# Patient Record
Sex: FEMALE | Race: WHITE | NOT HISPANIC OR LATINO | URBAN - METROPOLITAN AREA
[De-identification: names, ages, dates, MRNs, and addresses within clinical notes are randomized per-mention and may not be internally consistent; named-entity substitution may affect disease eponyms.]

---

## 2017-04-25 ENCOUNTER — OUTPATIENT (OUTPATIENT)
Dept: OUTPATIENT SERVICES | Facility: HOSPITAL | Age: 42
LOS: 1 days | Discharge: HOME | End: 2017-04-25

## 2017-04-25 DIAGNOSIS — C75.9 MALIGNANT NEOPLASM OF ENDOCRINE GLAND, UNSPECIFIED: ICD-10-CM

## 2017-04-27 ENCOUNTER — APPOINTMENT (OUTPATIENT)
Dept: HEMATOLOGY ONCOLOGY | Facility: CLINIC | Age: 42
End: 2017-04-27

## 2017-04-27 VITALS
SYSTOLIC BLOOD PRESSURE: 158 MMHG | TEMPERATURE: 98 F | BODY MASS INDEX: 28.17 KG/M2 | HEART RATE: 95 BPM | RESPIRATION RATE: 16 BRPM | HEIGHT: 64 IN | DIASTOLIC BLOOD PRESSURE: 117 MMHG | WEIGHT: 165 LBS | OXYGEN SATURATION: 95 %

## 2017-05-01 ENCOUNTER — RX RENEWAL (OUTPATIENT)
Age: 42
End: 2017-05-01

## 2017-05-03 RX ORDER — FENTANYL CITRATE 400 UG/1
400 LOZENGE TRANSMUCOSAL
Qty: 120 | Refills: 0 | Status: ACTIVE | COMMUNITY
Start: 2017-05-01 | End: 1900-01-01

## 2017-05-08 DIAGNOSIS — M79.2 NEURALGIA AND NEURITIS, UNSPECIFIED: ICD-10-CM

## 2017-05-25 ENCOUNTER — OUTPATIENT (OUTPATIENT)
Dept: OUTPATIENT SERVICES | Facility: HOSPITAL | Age: 42
LOS: 1 days | Discharge: HOME | End: 2017-05-25

## 2017-05-25 DIAGNOSIS — M79.2 NEURALGIA AND NEURITIS, UNSPECIFIED: ICD-10-CM

## 2017-05-26 ENCOUNTER — APPOINTMENT (OUTPATIENT)
Dept: HEMATOLOGY ONCOLOGY | Facility: CLINIC | Age: 42
End: 2017-05-26

## 2017-05-26 VITALS
TEMPERATURE: 98.4 F | SYSTOLIC BLOOD PRESSURE: 152 MMHG | HEART RATE: 117 BPM | OXYGEN SATURATION: 95 % | DIASTOLIC BLOOD PRESSURE: 108 MMHG | RESPIRATION RATE: 16 BRPM

## 2017-06-06 RX ORDER — FENTANYL CITRATE 600 UG/1
600 LOZENGE TRANSMUCOSAL
Qty: 120 | Refills: 0 | Status: ACTIVE | COMMUNITY
Start: 2017-05-26 | End: 1900-01-01

## 2017-06-21 ENCOUNTER — OUTPATIENT (OUTPATIENT)
Dept: OUTPATIENT SERVICES | Facility: HOSPITAL | Age: 42
LOS: 1 days | Discharge: HOME | End: 2017-06-21

## 2017-06-21 DIAGNOSIS — M79.2 NEURALGIA AND NEURITIS, UNSPECIFIED: ICD-10-CM

## 2017-06-26 ENCOUNTER — APPOINTMENT (OUTPATIENT)
Dept: HEMATOLOGY ONCOLOGY | Facility: CLINIC | Age: 42
End: 2017-06-26

## 2017-06-26 VITALS
TEMPERATURE: 98 F | HEIGHT: 64.02 IN | HEART RATE: 131 BPM | RESPIRATION RATE: 16 BRPM | OXYGEN SATURATION: 99 % | DIASTOLIC BLOOD PRESSURE: 91 MMHG | SYSTOLIC BLOOD PRESSURE: 149 MMHG

## 2017-06-27 RX ORDER — FENTANYL CITRATE 800 UG/1
800 LOZENGE TRANSMUCOSAL 4 TIMES DAILY
Qty: 120 | Refills: 0 | Status: DISCONTINUED | COMMUNITY
Start: 2017-06-26 | End: 2017-06-27

## 2017-06-27 RX ORDER — ONDANSETRON 4 MG/1
4 TABLET, ORALLY DISINTEGRATING ORAL
Qty: 60 | Refills: 0 | Status: DISCONTINUED | COMMUNITY
Start: 2017-05-26 | End: 2017-06-27

## 2017-07-12 ENCOUNTER — OTHER (OUTPATIENT)
Age: 42
End: 2017-07-12

## 2017-07-19 ENCOUNTER — APPOINTMENT (OUTPATIENT)
Dept: HEMATOLOGY ONCOLOGY | Facility: CLINIC | Age: 42
End: 2017-07-19

## 2017-07-19 VITALS
HEART RATE: 119 BPM | RESPIRATION RATE: 16 BRPM | OXYGEN SATURATION: 95 % | DIASTOLIC BLOOD PRESSURE: 96 MMHG | SYSTOLIC BLOOD PRESSURE: 145 MMHG | TEMPERATURE: 98.1 F

## 2017-07-19 RX ORDER — NALOXEGOL OXALATE 12.5 MG/1
12.5 TABLET, FILM COATED ORAL
Qty: 10 | Refills: 0 | Status: ACTIVE | COMMUNITY
Start: 2017-07-19 | End: 1900-01-01

## 2017-08-01 ENCOUNTER — RX RENEWAL (OUTPATIENT)
Age: 42
End: 2017-08-01

## 2017-08-04 ENCOUNTER — RX RENEWAL (OUTPATIENT)
Age: 42
End: 2017-08-04

## 2017-08-08 ENCOUNTER — RX RENEWAL (OUTPATIENT)
Age: 42
End: 2017-08-08

## 2017-08-09 ENCOUNTER — OUTPATIENT (OUTPATIENT)
Dept: OUTPATIENT SERVICES | Facility: HOSPITAL | Age: 42
LOS: 1 days | Discharge: HOME | End: 2017-08-09

## 2017-08-09 ENCOUNTER — RX RENEWAL (OUTPATIENT)
Age: 42
End: 2017-08-09

## 2017-08-09 DIAGNOSIS — M79.2 NEURALGIA AND NEURITIS, UNSPECIFIED: ICD-10-CM

## 2017-08-10 ENCOUNTER — APPOINTMENT (OUTPATIENT)
Dept: HEMATOLOGY ONCOLOGY | Facility: CLINIC | Age: 42
End: 2017-08-10
Payer: COMMERCIAL

## 2017-08-10 VITALS
RESPIRATION RATE: 16 BRPM | TEMPERATURE: 98.7 F | HEART RATE: 107 BPM | DIASTOLIC BLOOD PRESSURE: 91 MMHG | OXYGEN SATURATION: 96 % | SYSTOLIC BLOOD PRESSURE: 132 MMHG

## 2017-08-10 PROCEDURE — 99215 OFFICE O/P EST HI 40 MIN: CPT

## 2017-08-10 RX ORDER — TAPENTADOL HYDROCHLORIDE 150 MG/1
150 TABLET, FILM COATED, EXTENDED RELEASE ORAL
Qty: 30 | Refills: 0 | Status: DISCONTINUED | COMMUNITY
Start: 2017-05-26 | End: 2017-08-10

## 2017-09-01 ENCOUNTER — APPOINTMENT (OUTPATIENT)
Dept: HEMATOLOGY ONCOLOGY | Facility: CLINIC | Age: 42
End: 2017-09-01
Payer: COMMERCIAL

## 2017-09-01 VITALS
OXYGEN SATURATION: 98 % | DIASTOLIC BLOOD PRESSURE: 78 MMHG | HEART RATE: 126 BPM | RESPIRATION RATE: 16 BRPM | SYSTOLIC BLOOD PRESSURE: 120 MMHG | TEMPERATURE: 97.2 F

## 2017-09-01 PROCEDURE — 99214 OFFICE O/P EST MOD 30 MIN: CPT

## 2017-09-01 RX ORDER — OXYMORPHONE HYDROCHLORIDE 10 MG/1
10 TABLET ORAL
Qty: 60 | Refills: 0 | Status: DISCONTINUED | COMMUNITY
Start: 2017-05-26 | End: 2017-09-01

## 2017-09-01 RX ORDER — OXYMORPHONE HYDROCHLORIDE 10 MG/1
10 TABLET ORAL EVERY 4 HOURS
Qty: 360 | Refills: 0 | Status: DISCONTINUED | COMMUNITY
Start: 2017-08-10 | End: 2017-09-01

## 2017-09-13 ENCOUNTER — OUTPATIENT (OUTPATIENT)
Dept: OUTPATIENT SERVICES | Facility: HOSPITAL | Age: 42
LOS: 1 days | Discharge: HOME | End: 2017-09-13

## 2017-09-13 DIAGNOSIS — M79.2 NEURALGIA AND NEURITIS, UNSPECIFIED: ICD-10-CM

## 2017-09-15 ENCOUNTER — APPOINTMENT (OUTPATIENT)
Dept: HEMATOLOGY ONCOLOGY | Facility: CLINIC | Age: 42
End: 2017-09-15
Payer: COMMERCIAL

## 2017-09-15 VITALS
HEART RATE: 112 BPM | TEMPERATURE: 98.7 F | OXYGEN SATURATION: 96 % | RESPIRATION RATE: 16 BRPM | SYSTOLIC BLOOD PRESSURE: 134 MMHG | DIASTOLIC BLOOD PRESSURE: 90 MMHG

## 2017-09-15 PROCEDURE — 99215 OFFICE O/P EST HI 40 MIN: CPT

## 2017-09-21 ENCOUNTER — APPOINTMENT (OUTPATIENT)
Dept: HEMATOLOGY ONCOLOGY | Facility: CLINIC | Age: 42
End: 2017-09-21

## 2017-09-25 ENCOUNTER — APPOINTMENT (OUTPATIENT)
Dept: HEMATOLOGY ONCOLOGY | Facility: CLINIC | Age: 42
End: 2017-09-25
Payer: COMMERCIAL

## 2017-09-25 VITALS
SYSTOLIC BLOOD PRESSURE: 132 MMHG | RESPIRATION RATE: 16 BRPM | OXYGEN SATURATION: 91 % | TEMPERATURE: 98.2 F | HEART RATE: 114 BPM | DIASTOLIC BLOOD PRESSURE: 91 MMHG

## 2017-09-25 PROCEDURE — 99215 OFFICE O/P EST HI 40 MIN: CPT

## 2017-09-26 ENCOUNTER — RX RENEWAL (OUTPATIENT)
Age: 42
End: 2017-09-26

## 2017-09-29 ENCOUNTER — RX RENEWAL (OUTPATIENT)
Age: 42
End: 2017-09-29

## 2017-10-03 ENCOUNTER — RX RENEWAL (OUTPATIENT)
Age: 42
End: 2017-10-03

## 2017-10-04 ENCOUNTER — RX RENEWAL (OUTPATIENT)
Age: 42
End: 2017-10-04

## 2017-10-09 ENCOUNTER — APPOINTMENT (OUTPATIENT)
Dept: HEMATOLOGY ONCOLOGY | Facility: CLINIC | Age: 42
End: 2017-10-09
Payer: COMMERCIAL

## 2017-10-09 VITALS
TEMPERATURE: 98.1 F | SYSTOLIC BLOOD PRESSURE: 170 MMHG | RESPIRATION RATE: 16 BRPM | DIASTOLIC BLOOD PRESSURE: 118 MMHG | HEART RATE: 92 BPM | OXYGEN SATURATION: 100 %

## 2017-10-09 PROCEDURE — 99215 OFFICE O/P EST HI 40 MIN: CPT

## 2017-10-16 ENCOUNTER — MEDICATION RENEWAL (OUTPATIENT)
Age: 42
End: 2017-10-16

## 2017-10-18 ENCOUNTER — OUTPATIENT (OUTPATIENT)
Dept: OUTPATIENT SERVICES | Facility: HOSPITAL | Age: 42
LOS: 1 days | Discharge: HOME | End: 2017-10-18

## 2017-10-18 DIAGNOSIS — M79.2 NEURALGIA AND NEURITIS, UNSPECIFIED: ICD-10-CM

## 2017-10-23 ENCOUNTER — RX RENEWAL (OUTPATIENT)
Age: 42
End: 2017-10-23

## 2017-10-27 ENCOUNTER — MEDICATION RENEWAL (OUTPATIENT)
Age: 42
End: 2017-10-27

## 2017-11-03 ENCOUNTER — APPOINTMENT (OUTPATIENT)
Dept: HEMATOLOGY ONCOLOGY | Facility: CLINIC | Age: 42
End: 2017-11-03
Payer: COMMERCIAL

## 2017-11-03 VITALS
BODY MASS INDEX: 27.45 KG/M2 | SYSTOLIC BLOOD PRESSURE: 140 MMHG | DIASTOLIC BLOOD PRESSURE: 104 MMHG | TEMPERATURE: 98.9 F | HEART RATE: 116 BPM | WEIGHT: 160 LBS | RESPIRATION RATE: 16 BRPM | OXYGEN SATURATION: 96 %

## 2017-11-03 PROCEDURE — 99215 OFFICE O/P EST HI 40 MIN: CPT

## 2017-11-09 ENCOUNTER — RX RENEWAL (OUTPATIENT)
Age: 42
End: 2017-11-09

## 2017-11-13 ENCOUNTER — APPOINTMENT (OUTPATIENT)
Dept: HEMATOLOGY ONCOLOGY | Facility: CLINIC | Age: 42
End: 2017-11-13

## 2017-11-15 ENCOUNTER — OUTPATIENT (OUTPATIENT)
Dept: OUTPATIENT SERVICES | Facility: HOSPITAL | Age: 42
LOS: 1 days | Discharge: HOME | End: 2017-11-15

## 2017-11-15 DIAGNOSIS — M79.2 NEURALGIA AND NEURITIS, UNSPECIFIED: ICD-10-CM

## 2017-11-20 ENCOUNTER — APPOINTMENT (OUTPATIENT)
Dept: HEMATOLOGY ONCOLOGY | Facility: CLINIC | Age: 42
End: 2017-11-20
Payer: COMMERCIAL

## 2017-11-20 VITALS
OXYGEN SATURATION: 96 % | DIASTOLIC BLOOD PRESSURE: 82 MMHG | RESPIRATION RATE: 16 BRPM | HEART RATE: 112 BPM | WEIGHT: 159.84 LBS | BODY MASS INDEX: 27.42 KG/M2 | SYSTOLIC BLOOD PRESSURE: 160 MMHG | TEMPERATURE: 99.1 F

## 2017-11-20 PROCEDURE — 99215 OFFICE O/P EST HI 40 MIN: CPT

## 2017-11-20 RX ORDER — FENTANYL CITRATE 600 UG/1
600 LOZENGE TRANSMUCOSAL
Qty: 120 | Refills: 0 | Status: ACTIVE | COMMUNITY
Start: 2017-11-20 | End: 1900-01-01

## 2017-11-21 ENCOUNTER — RX RENEWAL (OUTPATIENT)
Age: 42
End: 2017-11-21

## 2017-12-04 ENCOUNTER — APPOINTMENT (OUTPATIENT)
Dept: HEMATOLOGY ONCOLOGY | Facility: CLINIC | Age: 42
End: 2017-12-04
Payer: COMMERCIAL

## 2017-12-04 VITALS
TEMPERATURE: 98.9 F | OXYGEN SATURATION: 95 % | HEART RATE: 83 BPM | RESPIRATION RATE: 16 BRPM | DIASTOLIC BLOOD PRESSURE: 96 MMHG | SYSTOLIC BLOOD PRESSURE: 181 MMHG

## 2017-12-04 PROCEDURE — 99215 OFFICE O/P EST HI 40 MIN: CPT

## 2017-12-12 ENCOUNTER — OUTPATIENT (OUTPATIENT)
Dept: OUTPATIENT SERVICES | Facility: HOSPITAL | Age: 42
LOS: 1 days | Discharge: HOME | End: 2017-12-12

## 2017-12-12 DIAGNOSIS — M79.2 NEURALGIA AND NEURITIS, UNSPECIFIED: ICD-10-CM

## 2017-12-18 ENCOUNTER — APPOINTMENT (OUTPATIENT)
Dept: HEMATOLOGY ONCOLOGY | Facility: CLINIC | Age: 42
End: 2017-12-18
Payer: COMMERCIAL

## 2017-12-18 VITALS
TEMPERATURE: 98.5 F | OXYGEN SATURATION: 96 % | HEART RATE: 112 BPM | SYSTOLIC BLOOD PRESSURE: 150 MMHG | DIASTOLIC BLOOD PRESSURE: 80 MMHG | RESPIRATION RATE: 16 BRPM

## 2017-12-18 PROCEDURE — 99215 OFFICE O/P EST HI 40 MIN: CPT

## 2017-12-20 ENCOUNTER — RX RENEWAL (OUTPATIENT)
Age: 42
End: 2017-12-20

## 2017-12-22 ENCOUNTER — RX RENEWAL (OUTPATIENT)
Age: 42
End: 2017-12-22

## 2017-12-29 ENCOUNTER — MEDICATION RENEWAL (OUTPATIENT)
Age: 42
End: 2017-12-29

## 2018-01-03 ENCOUNTER — RX RENEWAL (OUTPATIENT)
Age: 43
End: 2018-01-03

## 2018-01-04 ENCOUNTER — APPOINTMENT (OUTPATIENT)
Dept: HEMATOLOGY ONCOLOGY | Facility: CLINIC | Age: 43
End: 2018-01-04

## 2018-01-16 ENCOUNTER — RX RENEWAL (OUTPATIENT)
Age: 43
End: 2018-01-16

## 2018-01-17 ENCOUNTER — OUTPATIENT (OUTPATIENT)
Dept: OUTPATIENT SERVICES | Facility: HOSPITAL | Age: 43
LOS: 1 days | Discharge: HOME | End: 2018-01-17

## 2018-01-17 DIAGNOSIS — M79.2 NEURALGIA AND NEURITIS, UNSPECIFIED: ICD-10-CM

## 2018-01-18 ENCOUNTER — APPOINTMENT (OUTPATIENT)
Dept: HEMATOLOGY ONCOLOGY | Facility: CLINIC | Age: 43
End: 2018-01-18

## 2018-01-23 ENCOUNTER — RX RENEWAL (OUTPATIENT)
Age: 43
End: 2018-01-23

## 2018-01-23 RX ORDER — TAPENTADOL HYDROCHLORIDE 150 MG/1
150 TABLET, FILM COATED, EXTENDED RELEASE ORAL
Qty: 30 | Refills: 0 | Status: ACTIVE | COMMUNITY
Start: 2017-09-15 | End: 1900-01-01

## 2018-01-25 ENCOUNTER — APPOINTMENT (OUTPATIENT)
Dept: HEMATOLOGY ONCOLOGY | Facility: CLINIC | Age: 43
End: 2018-01-25
Payer: COMMERCIAL

## 2018-01-25 VITALS
OXYGEN SATURATION: 97 % | RESPIRATION RATE: 16 BRPM | HEART RATE: 97 BPM | SYSTOLIC BLOOD PRESSURE: 176 MMHG | DIASTOLIC BLOOD PRESSURE: 132 MMHG | TEMPERATURE: 98.3 F

## 2018-01-25 PROCEDURE — 99215 OFFICE O/P EST HI 40 MIN: CPT

## 2018-01-25 RX ORDER — OXYMORPHONE HYDROCHLORIDE 20 MG/1
20 TABLET, FILM COATED, EXTENDED RELEASE ORAL
Qty: 30 | Refills: 0 | Status: ACTIVE | COMMUNITY
Start: 2018-01-25 | End: 1900-01-01

## 2018-02-07 ENCOUNTER — RX RENEWAL (OUTPATIENT)
Age: 43
End: 2018-02-07

## 2018-02-08 ENCOUNTER — RX RENEWAL (OUTPATIENT)
Age: 43
End: 2018-02-08

## 2018-02-15 ENCOUNTER — OUTPATIENT (OUTPATIENT)
Dept: OUTPATIENT SERVICES | Facility: HOSPITAL | Age: 43
LOS: 1 days | Discharge: HOME | End: 2018-02-15

## 2018-02-15 DIAGNOSIS — M79.2 NEURALGIA AND NEURITIS, UNSPECIFIED: ICD-10-CM

## 2018-02-22 ENCOUNTER — APPOINTMENT (OUTPATIENT)
Dept: HEMATOLOGY ONCOLOGY | Facility: CLINIC | Age: 43
End: 2018-02-22
Payer: COMMERCIAL

## 2018-02-22 VITALS — OXYGEN SATURATION: 99 % | RESPIRATION RATE: 16 BRPM | HEART RATE: 90 BPM | TEMPERATURE: 98 F

## 2018-02-22 PROCEDURE — 99215 OFFICE O/P EST HI 40 MIN: CPT

## 2018-02-28 ENCOUNTER — RX RENEWAL (OUTPATIENT)
Age: 43
End: 2018-02-28

## 2018-03-08 ENCOUNTER — APPOINTMENT (OUTPATIENT)
Dept: HEMATOLOGY ONCOLOGY | Facility: CLINIC | Age: 43
End: 2018-03-08
Payer: COMMERCIAL

## 2018-03-08 VITALS
SYSTOLIC BLOOD PRESSURE: 180 MMHG | OXYGEN SATURATION: 96 % | DIASTOLIC BLOOD PRESSURE: 133 MMHG | TEMPERATURE: 98.6 F | HEART RATE: 117 BPM | RESPIRATION RATE: 16 BRPM

## 2018-03-08 PROCEDURE — 99215 OFFICE O/P EST HI 40 MIN: CPT

## 2018-03-09 ENCOUNTER — APPOINTMENT (OUTPATIENT)
Dept: PHYSICAL MEDICINE AND REHAB | Facility: CLINIC | Age: 43
End: 2018-03-09
Payer: COMMERCIAL

## 2018-03-09 VITALS
OXYGEN SATURATION: 95 % | SYSTOLIC BLOOD PRESSURE: 164 MMHG | TEMPERATURE: 98 F | DIASTOLIC BLOOD PRESSURE: 121 MMHG | HEART RATE: 91 BPM

## 2018-03-09 PROCEDURE — 99204 OFFICE O/P NEW MOD 45 MIN: CPT

## 2018-03-19 ENCOUNTER — RX RENEWAL (OUTPATIENT)
Age: 43
End: 2018-03-19

## 2018-03-19 RX ORDER — OXYMORPHONE HYDROCHLORIDE 20 MG/1
20 TABLET, FILM COATED, EXTENDED RELEASE ORAL
Qty: 60 | Refills: 0 | Status: ACTIVE | COMMUNITY
Start: 2018-02-22 | End: 1900-01-01

## 2018-03-28 ENCOUNTER — OUTPATIENT (OUTPATIENT)
Dept: OUTPATIENT SERVICES | Facility: HOSPITAL | Age: 43
LOS: 1 days | Discharge: HOME | End: 2018-03-28

## 2018-03-28 DIAGNOSIS — M79.2 NEURALGIA AND NEURITIS, UNSPECIFIED: ICD-10-CM

## 2018-03-30 ENCOUNTER — APPOINTMENT (OUTPATIENT)
Dept: HEMATOLOGY ONCOLOGY | Facility: CLINIC | Age: 43
End: 2018-03-30
Payer: COMMERCIAL

## 2018-03-30 VITALS
RESPIRATION RATE: 16 BRPM | TEMPERATURE: 98.5 F | OXYGEN SATURATION: 95 % | SYSTOLIC BLOOD PRESSURE: 147 MMHG | HEART RATE: 100 BPM | DIASTOLIC BLOOD PRESSURE: 100 MMHG

## 2018-03-30 PROCEDURE — 99215 OFFICE O/P EST HI 40 MIN: CPT

## 2018-03-30 RX ORDER — CLONIDINE 0.2 MG/24H
0.2 PATCH, EXTENDED RELEASE TRANSDERMAL
Qty: 4 | Refills: 0 | Status: ACTIVE | COMMUNITY
Start: 2018-03-30 | End: 1900-01-01

## 2018-04-13 ENCOUNTER — MEDICATION RENEWAL (OUTPATIENT)
Age: 43
End: 2018-04-13

## 2018-04-13 ENCOUNTER — APPOINTMENT (OUTPATIENT)
Dept: HEMATOLOGY ONCOLOGY | Facility: CLINIC | Age: 43
End: 2018-04-13
Payer: COMMERCIAL

## 2018-04-13 VITALS
TEMPERATURE: 98.5 F | SYSTOLIC BLOOD PRESSURE: 150 MMHG | DIASTOLIC BLOOD PRESSURE: 111 MMHG | WEIGHT: 160 LBS | OXYGEN SATURATION: 96 % | BODY MASS INDEX: 27.45 KG/M2 | RESPIRATION RATE: 16 BRPM | HEART RATE: 115 BPM

## 2018-04-13 PROCEDURE — 99215 OFFICE O/P EST HI 40 MIN: CPT

## 2018-04-13 RX ORDER — SUCRALFATE 1 G/10ML
1 SUSPENSION ORAL 3 TIMES DAILY
Qty: 900 | Refills: 0 | Status: ACTIVE | COMMUNITY
Start: 2017-10-03 | End: 1900-01-01

## 2018-04-13 RX ORDER — KETOROLAC TROMETHAMINE 10 MG/1
10 TABLET, FILM COATED ORAL 3 TIMES DAILY
Qty: 21 | Refills: 0 | Status: ACTIVE | COMMUNITY
Start: 2018-04-13 | End: 1900-01-01

## 2018-04-24 ENCOUNTER — RX RENEWAL (OUTPATIENT)
Age: 43
End: 2018-04-24

## 2018-05-01 ENCOUNTER — OUTPATIENT (OUTPATIENT)
Dept: OUTPATIENT SERVICES | Facility: HOSPITAL | Age: 43
LOS: 1 days | Discharge: HOME | End: 2018-05-01

## 2018-05-01 DIAGNOSIS — M79.2 NEURALGIA AND NEURITIS, UNSPECIFIED: ICD-10-CM

## 2018-05-03 ENCOUNTER — APPOINTMENT (OUTPATIENT)
Dept: HEMATOLOGY ONCOLOGY | Facility: CLINIC | Age: 43
End: 2018-05-03
Payer: COMMERCIAL

## 2018-05-03 VITALS
HEART RATE: 118 BPM | RESPIRATION RATE: 16 BRPM | DIASTOLIC BLOOD PRESSURE: 82 MMHG | SYSTOLIC BLOOD PRESSURE: 150 MMHG | OXYGEN SATURATION: 96 % | TEMPERATURE: 99.2 F

## 2018-05-03 PROCEDURE — 99215 OFFICE O/P EST HI 40 MIN: CPT

## 2018-05-17 ENCOUNTER — APPOINTMENT (OUTPATIENT)
Dept: HEMATOLOGY ONCOLOGY | Facility: CLINIC | Age: 43
End: 2018-05-17
Payer: COMMERCIAL

## 2018-05-17 VITALS
DIASTOLIC BLOOD PRESSURE: 80 MMHG | HEART RATE: 110 BPM | SYSTOLIC BLOOD PRESSURE: 140 MMHG | OXYGEN SATURATION: 96 % | RESPIRATION RATE: 16 BRPM | TEMPERATURE: 98.6 F

## 2018-05-17 PROCEDURE — 99214 OFFICE O/P EST MOD 30 MIN: CPT

## 2018-05-22 ENCOUNTER — RX RENEWAL (OUTPATIENT)
Age: 43
End: 2018-05-22

## 2018-05-31 ENCOUNTER — APPOINTMENT (OUTPATIENT)
Dept: HEMATOLOGY ONCOLOGY | Facility: CLINIC | Age: 43
End: 2018-05-31
Payer: COMMERCIAL

## 2018-05-31 VITALS
OXYGEN SATURATION: 97 % | TEMPERATURE: 99.2 F | SYSTOLIC BLOOD PRESSURE: 146 MMHG | DIASTOLIC BLOOD PRESSURE: 78 MMHG | HEART RATE: 126 BPM | RESPIRATION RATE: 18 BRPM

## 2018-05-31 PROCEDURE — 99215 OFFICE O/P EST HI 40 MIN: CPT

## 2018-05-31 RX ORDER — TAPENTADOL HYDROCHLORIDE 50 MG/1
50 TABLET, FILM COATED, EXTENDED RELEASE ORAL
Qty: 60 | Refills: 0 | Status: ACTIVE | COMMUNITY
Start: 2017-07-19 | End: 1900-01-01

## 2018-05-31 RX ORDER — CARISOPRODOL 250 MG/1
250 TABLET ORAL
Qty: 120 | Refills: 0 | Status: DISCONTINUED | COMMUNITY
Start: 2017-09-01 | End: 2018-05-31

## 2018-05-31 RX ORDER — CARISOPRODOL 250 MG/1
250 TABLET ORAL
Qty: 120 | Refills: 0 | Status: ACTIVE | COMMUNITY
Start: 2018-05-31 | End: 1900-01-01

## 2018-06-13 ENCOUNTER — RX RENEWAL (OUTPATIENT)
Age: 43
End: 2018-06-13

## 2018-06-13 RX ORDER — HYDROMORPHONE HYDROCHLORIDE 8 MG/1
8 TABLET ORAL
Qty: 112 | Refills: 0 | Status: ACTIVE | COMMUNITY
Start: 2018-05-17 | End: 1900-01-01

## 2018-06-15 ENCOUNTER — OUTPATIENT (OUTPATIENT)
Dept: OUTPATIENT SERVICES | Facility: HOSPITAL | Age: 43
LOS: 1 days | Discharge: HOME | End: 2018-06-15

## 2018-06-15 DIAGNOSIS — M79.2 NEURALGIA AND NEURITIS, UNSPECIFIED: ICD-10-CM

## 2018-06-18 ENCOUNTER — APPOINTMENT (OUTPATIENT)
Dept: HEMATOLOGY ONCOLOGY | Facility: CLINIC | Age: 43
End: 2018-06-18

## 2018-06-21 ENCOUNTER — RX RENEWAL (OUTPATIENT)
Age: 43
End: 2018-06-21

## 2018-06-21 RX ORDER — FENTANYL CITRATE 800 UG/1
800 LOZENGE TRANSMUCOSAL
Qty: 180 | Refills: 0 | Status: ACTIVE | COMMUNITY
Start: 2017-10-04 | End: 1900-01-01

## 2018-06-27 ENCOUNTER — APPOINTMENT (OUTPATIENT)
Dept: PHYSICAL MEDICINE AND REHAB | Facility: CLINIC | Age: 43
End: 2018-06-27

## 2018-07-02 ENCOUNTER — APPOINTMENT (OUTPATIENT)
Dept: HEMATOLOGY ONCOLOGY | Facility: CLINIC | Age: 43
End: 2018-07-02
Payer: COMMERCIAL

## 2018-07-02 VITALS
HEART RATE: 103 BPM | DIASTOLIC BLOOD PRESSURE: 110 MMHG | SYSTOLIC BLOOD PRESSURE: 159 MMHG | OXYGEN SATURATION: 95 % | RESPIRATION RATE: 16 BRPM | TEMPERATURE: 98.4 F

## 2018-07-02 DIAGNOSIS — T40.2X5A DRUG INDUCED CONSTIPATION: ICD-10-CM

## 2018-07-02 DIAGNOSIS — K59.03 DRUG INDUCED CONSTIPATION: ICD-10-CM

## 2018-07-02 DIAGNOSIS — R11.0 NAUSEA: ICD-10-CM

## 2018-07-02 PROCEDURE — 99215 OFFICE O/P EST HI 40 MIN: CPT

## 2018-07-04 PROBLEM — K59.03 CONSTIPATION DUE TO OPIOID THERAPY: Status: ACTIVE | Noted: 2017-07-19

## 2018-07-04 PROBLEM — R11.0 NAUSEA: Status: ACTIVE | Noted: 2017-06-26

## 2018-07-13 ENCOUNTER — OUTPATIENT (OUTPATIENT)
Dept: OUTPATIENT SERVICES | Facility: HOSPITAL | Age: 43
LOS: 1 days | Discharge: HOME | End: 2018-07-13

## 2018-07-13 DIAGNOSIS — M79.2 NEURALGIA AND NEURITIS, UNSPECIFIED: ICD-10-CM

## 2018-07-20 ENCOUNTER — APPOINTMENT (OUTPATIENT)
Dept: HEMATOLOGY ONCOLOGY | Facility: CLINIC | Age: 43
End: 2018-07-20

## 2018-08-02 ENCOUNTER — APPOINTMENT (OUTPATIENT)
Dept: HEMATOLOGY ONCOLOGY | Facility: CLINIC | Age: 43
End: 2018-08-02
Payer: COMMERCIAL

## 2018-08-02 VITALS
HEART RATE: 91 BPM | TEMPERATURE: 99 F | SYSTOLIC BLOOD PRESSURE: 186 MMHG | OXYGEN SATURATION: 97 % | DIASTOLIC BLOOD PRESSURE: 132 MMHG | RESPIRATION RATE: 16 BRPM

## 2018-08-02 PROCEDURE — 99215 OFFICE O/P EST HI 40 MIN: CPT

## 2018-08-09 ENCOUNTER — RX RENEWAL (OUTPATIENT)
Age: 43
End: 2018-08-09

## 2018-08-13 ENCOUNTER — OUTPATIENT (OUTPATIENT)
Dept: OUTPATIENT SERVICES | Facility: HOSPITAL | Age: 43
LOS: 1 days | Discharge: HOME | End: 2018-08-13

## 2018-08-13 DIAGNOSIS — M79.2 NEURALGIA AND NEURITIS, UNSPECIFIED: ICD-10-CM

## 2018-08-23 ENCOUNTER — APPOINTMENT (OUTPATIENT)
Dept: HEMATOLOGY ONCOLOGY | Facility: CLINIC | Age: 43
End: 2018-08-23
Payer: COMMERCIAL

## 2018-08-23 VITALS
RESPIRATION RATE: 16 BRPM | HEART RATE: 93 BPM | SYSTOLIC BLOOD PRESSURE: 150 MMHG | DIASTOLIC BLOOD PRESSURE: 90 MMHG | TEMPERATURE: 98.9 F | OXYGEN SATURATION: 97 %

## 2018-08-23 PROCEDURE — 99215 OFFICE O/P EST HI 40 MIN: CPT

## 2018-08-23 RX ORDER — OXYMORPHONE HYDROCHLORIDE 10 MG/1
10 TABLET ORAL
Qty: 360 | Refills: 0 | Status: DISCONTINUED | COMMUNITY
Start: 2017-08-10 | End: 2018-08-23

## 2018-09-01 ENCOUNTER — RX RENEWAL (OUTPATIENT)
Age: 43
End: 2018-09-01

## 2018-09-01 RX ORDER — ONDANSETRON HYDROCHLORIDE 4 MG/1
4 TABLET, ORALLY DISINTEGRATING ORAL
Qty: 60 | Refills: 0 | Status: ACTIVE | COMMUNITY
Start: 2018-09-01 | End: 1900-01-01

## 2018-09-11 ENCOUNTER — APPOINTMENT (OUTPATIENT)
Dept: HEMATOLOGY ONCOLOGY | Facility: CLINIC | Age: 43
End: 2018-09-11

## 2018-09-11 ENCOUNTER — OUTPATIENT (OUTPATIENT)
Dept: OUTPATIENT SERVICES | Facility: HOSPITAL | Age: 43
LOS: 1 days | Discharge: HOME | End: 2018-09-11

## 2018-09-11 DIAGNOSIS — M79.2 NEURALGIA AND NEURITIS, UNSPECIFIED: ICD-10-CM

## 2018-09-13 ENCOUNTER — RX RENEWAL (OUTPATIENT)
Age: 43
End: 2018-09-13

## 2018-09-13 ENCOUNTER — APPOINTMENT (OUTPATIENT)
Dept: HEMATOLOGY ONCOLOGY | Facility: CLINIC | Age: 43
End: 2018-09-13

## 2018-09-27 ENCOUNTER — APPOINTMENT (OUTPATIENT)
Dept: HEMATOLOGY ONCOLOGY | Facility: CLINIC | Age: 43
End: 2018-09-27
Payer: COMMERCIAL

## 2018-09-27 VITALS
DIASTOLIC BLOOD PRESSURE: 118 MMHG | TEMPERATURE: 98.9 F | OXYGEN SATURATION: 97 % | SYSTOLIC BLOOD PRESSURE: 167 MMHG | RESPIRATION RATE: 16 BRPM

## 2018-09-27 PROCEDURE — 99215 OFFICE O/P EST HI 40 MIN: CPT

## 2018-09-28 RX ORDER — METHADONE HYDROCHLORIDE 10 MG/1
10 TABLET ORAL
Qty: 11 | Refills: 0 | Status: ACTIVE | COMMUNITY
Start: 2018-09-28 | End: 1900-01-01

## 2018-10-16 ENCOUNTER — RX RENEWAL (OUTPATIENT)
Age: 43
End: 2018-10-16

## 2018-10-17 ENCOUNTER — RX RENEWAL (OUTPATIENT)
Age: 43
End: 2018-10-17

## 2018-10-18 ENCOUNTER — RX CHANGE (OUTPATIENT)
Age: 43
End: 2018-10-18

## 2018-10-19 ENCOUNTER — OUTPATIENT (OUTPATIENT)
Dept: OUTPATIENT SERVICES | Facility: HOSPITAL | Age: 43
LOS: 1 days | Discharge: HOME | End: 2018-10-19

## 2018-10-19 DIAGNOSIS — M79.2 NEURALGIA AND NEURITIS, UNSPECIFIED: ICD-10-CM

## 2018-10-25 ENCOUNTER — APPOINTMENT (OUTPATIENT)
Dept: HEMATOLOGY ONCOLOGY | Facility: CLINIC | Age: 43
End: 2018-10-25
Payer: COMMERCIAL

## 2018-10-25 VITALS
OXYGEN SATURATION: 94 % | SYSTOLIC BLOOD PRESSURE: 173 MMHG | HEART RATE: 121 BPM | DIASTOLIC BLOOD PRESSURE: 110 MMHG | RESPIRATION RATE: 17 BRPM | TEMPERATURE: 98.9 F

## 2018-10-25 PROCEDURE — 99215 OFFICE O/P EST HI 40 MIN: CPT

## 2018-10-26 ENCOUNTER — RX RENEWAL (OUTPATIENT)
Age: 43
End: 2018-10-26

## 2018-10-26 RX ORDER — OXYMORPHONE HYDROCHLORIDE 10 MG/1
10 TABLET ORAL
Qty: 150 | Refills: 0 | Status: DISCONTINUED | COMMUNITY
Start: 2017-05-26 | End: 2018-10-26

## 2018-11-16 ENCOUNTER — APPOINTMENT (OUTPATIENT)
Dept: HEMATOLOGY ONCOLOGY | Facility: CLINIC | Age: 43
End: 2018-11-16
Payer: COMMERCIAL

## 2018-11-16 VITALS
DIASTOLIC BLOOD PRESSURE: 129 MMHG | RESPIRATION RATE: 16 BRPM | WEIGHT: 160 LBS | SYSTOLIC BLOOD PRESSURE: 176 MMHG | OXYGEN SATURATION: 98 % | HEART RATE: 96 BPM | BODY MASS INDEX: 27.45 KG/M2 | TEMPERATURE: 98.3 F

## 2018-11-16 PROCEDURE — 99215 OFFICE O/P EST HI 40 MIN: CPT

## 2018-11-16 RX ORDER — LORAZEPAM 2 MG/ML
2 SOLUTION, CONCENTRATE ORAL
Qty: 120 | Refills: 0 | Status: ACTIVE | COMMUNITY
Start: 2018-11-16 | End: 1900-01-01

## 2018-11-27 ENCOUNTER — RX RENEWAL (OUTPATIENT)
Age: 43
End: 2018-11-27

## 2018-12-10 ENCOUNTER — APPOINTMENT (OUTPATIENT)
Dept: HEMATOLOGY ONCOLOGY | Facility: CLINIC | Age: 43
End: 2018-12-10

## 2018-12-10 ENCOUNTER — OUTPATIENT (OUTPATIENT)
Dept: OUTPATIENT SERVICES | Facility: HOSPITAL | Age: 43
LOS: 1 days | Discharge: HOME | End: 2018-12-10

## 2018-12-10 DIAGNOSIS — M79.2 NEURALGIA AND NEURITIS, UNSPECIFIED: ICD-10-CM

## 2018-12-13 ENCOUNTER — APPOINTMENT (OUTPATIENT)
Dept: HEMATOLOGY ONCOLOGY | Facility: CLINIC | Age: 43
End: 2018-12-13
Payer: COMMERCIAL

## 2018-12-13 VITALS
DIASTOLIC BLOOD PRESSURE: 152 MMHG | TEMPERATURE: 98.1 F | HEART RATE: 108 BPM | SYSTOLIC BLOOD PRESSURE: 199 MMHG | OXYGEN SATURATION: 98 % | RESPIRATION RATE: 17 BRPM

## 2018-12-13 DIAGNOSIS — R26.9 UNSPECIFIED ABNORMALITIES OF GAIT AND MOBILITY: ICD-10-CM

## 2018-12-13 PROCEDURE — 99215 OFFICE O/P EST HI 40 MIN: CPT

## 2018-12-13 RX ORDER — DEXAMETHASONE 4 MG/1
4 TABLET ORAL
Qty: 60 | Refills: 0 | Status: ACTIVE | COMMUNITY
Start: 2018-07-02 | End: 1900-01-01

## 2018-12-15 ENCOUNTER — RX RENEWAL (OUTPATIENT)
Age: 43
End: 2018-12-15

## 2018-12-15 PROBLEM — R26.9 GAIT ABNORMALITY: Status: ACTIVE | Noted: 2018-03-09

## 2019-01-01 ENCOUNTER — MESSAGE (OUTPATIENT)
Age: 44
End: 2019-01-01

## 2019-01-01 ENCOUNTER — RX RENEWAL (OUTPATIENT)
Age: 44
End: 2019-01-01

## 2019-01-01 ENCOUNTER — OUTPATIENT (OUTPATIENT)
Dept: OUTPATIENT SERVICES | Facility: HOSPITAL | Age: 44
LOS: 1 days | Discharge: HOME | End: 2019-01-01

## 2019-01-01 ENCOUNTER — APPOINTMENT (OUTPATIENT)
Dept: HEMATOLOGY ONCOLOGY | Facility: CLINIC | Age: 44
End: 2019-01-01
Payer: COMMERCIAL

## 2019-01-01 ENCOUNTER — RX CHANGE (OUTPATIENT)
Age: 44
End: 2019-01-01

## 2019-01-01 VITALS
TEMPERATURE: 98.6 F | RESPIRATION RATE: 18 BRPM | DIASTOLIC BLOOD PRESSURE: 132 MMHG | SYSTOLIC BLOOD PRESSURE: 176 MMHG | OXYGEN SATURATION: 96 % | HEART RATE: 166 BPM

## 2019-01-01 VITALS — OXYGEN SATURATION: 96 % | TEMPERATURE: 99 F | HEART RATE: 108 BPM | RESPIRATION RATE: 17 BRPM

## 2019-01-01 VITALS — TEMPERATURE: 98.5 F | RESPIRATION RATE: 18 BRPM | HEART RATE: 110 BPM | OXYGEN SATURATION: 97 %

## 2019-01-01 VITALS
TEMPERATURE: 99.1 F | SYSTOLIC BLOOD PRESSURE: 174 MMHG | OXYGEN SATURATION: 94 % | HEART RATE: 114 BPM | RESPIRATION RATE: 16 BRPM | DIASTOLIC BLOOD PRESSURE: 120 MMHG

## 2019-01-01 DIAGNOSIS — M79.2 NEURALGIA AND NEURITIS, UNSPECIFIED: ICD-10-CM

## 2019-01-01 PROCEDURE — 99215 OFFICE O/P EST HI 40 MIN: CPT

## 2019-01-01 RX ORDER — METOPROLOL TARTRATE 25 MG/1
25 TABLET, FILM COATED ORAL DAILY
Qty: 30 | Refills: 0 | Status: ACTIVE | COMMUNITY
Start: 2019-01-01 | End: 1900-01-01

## 2019-01-01 RX ORDER — FENTANYL CITRATE 800 UG/1
800 LOZENGE TRANSMUCOSAL
Qty: 90 | Refills: 0 | Status: ACTIVE | COMMUNITY
Start: 2019-01-01 | End: 1900-01-01

## 2019-01-01 RX ORDER — TAPENTADOL HYDROCHLORIDE 100 MG/1
100 TABLET, FILM COATED, EXTENDED RELEASE ORAL DAILY
Qty: 30 | Refills: 0 | Status: ACTIVE | COMMUNITY
Start: 2017-08-10 | End: 1900-01-01

## 2019-01-01 RX ORDER — TAPENTADOL HYDROCHLORIDE 50 MG/1
50 TABLET, FILM COATED ORAL 4 TIMES DAILY
Qty: 120 | Refills: 0 | Status: ACTIVE | COMMUNITY
Start: 2017-12-20 | End: 1900-01-01

## 2019-01-01 RX ORDER — OXYMORPHONE HYDROCHLORIDE 40 MG/1
40 TABLET, FILM COATED, EXTENDED RELEASE ORAL
Qty: 60 | Refills: 0 | Status: DISCONTINUED | COMMUNITY
Start: 2018-10-25 | End: 2019-01-01

## 2019-01-01 RX ORDER — TAPENTADOL HYDROCHLORIDE 200 MG/1
200 TABLET, FILM COATED, EXTENDED RELEASE ORAL
Qty: 14 | Refills: 0 | Status: ACTIVE | COMMUNITY
Start: 2019-06-24 | End: 1900-01-01

## 2019-01-01 RX ORDER — FENTANYL CITRATE 800 UG/1
800 LOZENGE TRANSMUCOSAL 4 TIMES DAILY
Qty: 120 | Refills: 0 | Status: ACTIVE | COMMUNITY
Start: 2017-06-27 | End: 1900-01-01

## 2019-01-01 RX ORDER — CARISOPRODOL 350 MG/1
350 TABLET ORAL
Qty: 120 | Refills: 0 | Status: ACTIVE | COMMUNITY
Start: 2017-12-20 | End: 1900-01-01

## 2019-01-03 ENCOUNTER — APPOINTMENT (OUTPATIENT)
Dept: HEMATOLOGY ONCOLOGY | Facility: CLINIC | Age: 44
End: 2019-01-03
Payer: COMMERCIAL

## 2019-01-03 VITALS
DIASTOLIC BLOOD PRESSURE: 109 MMHG | OXYGEN SATURATION: 94 % | HEART RATE: 110 BPM | TEMPERATURE: 98.6 F | SYSTOLIC BLOOD PRESSURE: 153 MMHG | RESPIRATION RATE: 16 BRPM

## 2019-01-03 PROCEDURE — 99215 OFFICE O/P EST HI 40 MIN: CPT

## 2019-01-03 RX ORDER — PROMETHAZINE HYDROCHLORIDE 25 MG/1
25 TABLET ORAL 3 TIMES DAILY
Qty: 90 | Refills: 0 | Status: ACTIVE | COMMUNITY
Start: 2019-01-03 | End: 1900-01-01

## 2019-01-30 ENCOUNTER — OUTPATIENT (OUTPATIENT)
Dept: OUTPATIENT SERVICES | Facility: HOSPITAL | Age: 44
LOS: 1 days | Discharge: HOME | End: 2019-01-30

## 2019-01-30 DIAGNOSIS — M79.2 NEURALGIA AND NEURITIS, UNSPECIFIED: ICD-10-CM

## 2019-01-31 ENCOUNTER — APPOINTMENT (OUTPATIENT)
Dept: HEMATOLOGY ONCOLOGY | Facility: CLINIC | Age: 44
End: 2019-01-31
Payer: COMMERCIAL

## 2019-01-31 VITALS
DIASTOLIC BLOOD PRESSURE: 111 MMHG | TEMPERATURE: 98.1 F | SYSTOLIC BLOOD PRESSURE: 146 MMHG | OXYGEN SATURATION: 97 % | RESPIRATION RATE: 16 BRPM | HEART RATE: 114 BPM

## 2019-01-31 PROCEDURE — 99215 OFFICE O/P EST HI 40 MIN: CPT

## 2019-01-31 RX ORDER — FENTANYL CITRATE 800 UG/1
800 LOZENGE TRANSMUCOSAL
Qty: 90 | Refills: 0 | Status: ACTIVE | COMMUNITY
Start: 2017-10-03 | End: 1900-01-01

## 2019-02-20 ENCOUNTER — RX RENEWAL (OUTPATIENT)
Age: 44
End: 2019-02-20

## 2019-02-22 ENCOUNTER — RX RENEWAL (OUTPATIENT)
Age: 44
End: 2019-02-22

## 2019-03-03 ENCOUNTER — RX RENEWAL (OUTPATIENT)
Age: 44
End: 2019-03-03

## 2019-03-08 ENCOUNTER — OUTPATIENT (OUTPATIENT)
Dept: OUTPATIENT SERVICES | Facility: HOSPITAL | Age: 44
LOS: 1 days | Discharge: HOME | End: 2019-03-08

## 2019-03-08 DIAGNOSIS — M79.2 NEURALGIA AND NEURITIS, UNSPECIFIED: ICD-10-CM

## 2019-03-11 ENCOUNTER — APPOINTMENT (OUTPATIENT)
Dept: HEMATOLOGY ONCOLOGY | Facility: CLINIC | Age: 44
End: 2019-03-11
Payer: COMMERCIAL

## 2019-03-11 VITALS
OXYGEN SATURATION: 96 % | SYSTOLIC BLOOD PRESSURE: 188 MMHG | DIASTOLIC BLOOD PRESSURE: 147 MMHG | RESPIRATION RATE: 20 BRPM | TEMPERATURE: 99.1 F | HEART RATE: 129 BPM

## 2019-03-11 PROCEDURE — 99215 OFFICE O/P EST HI 40 MIN: CPT

## 2019-03-14 RX ORDER — FENTANYL 400 UG/1
400 TABLET BUCCAL; SUBLINGUAL
Qty: 56 | Refills: 0 | Status: ACTIVE | COMMUNITY
Start: 2017-05-26 | End: 1900-01-01

## 2019-03-19 ENCOUNTER — RX RENEWAL (OUTPATIENT)
Age: 44
End: 2019-03-19

## 2019-03-21 ENCOUNTER — RX RENEWAL (OUTPATIENT)
Age: 44
End: 2019-03-21

## 2019-03-26 ENCOUNTER — RX RENEWAL (OUTPATIENT)
Age: 44
End: 2019-03-26

## 2019-03-27 ENCOUNTER — RX CHANGE (OUTPATIENT)
Age: 44
End: 2019-03-27

## 2019-03-27 RX ORDER — ALPRAZOLAM 1 MG/1
1 TABLET ORAL
Qty: 150 | Refills: 0 | Status: DISCONTINUED | COMMUNITY
Start: 2018-03-08 | End: 2019-03-27

## 2019-04-04 ENCOUNTER — OUTPATIENT (OUTPATIENT)
Dept: OUTPATIENT SERVICES | Facility: HOSPITAL | Age: 44
LOS: 1 days | Discharge: HOME | End: 2019-04-04

## 2019-04-04 DIAGNOSIS — M79.2 NEURALGIA AND NEURITIS, UNSPECIFIED: ICD-10-CM

## 2019-04-12 ENCOUNTER — RX RENEWAL (OUTPATIENT)
Age: 44
End: 2019-04-12

## 2019-04-12 RX ORDER — CLONIDINE 0.3 MG/24H
0.3 PATCH, EXTENDED RELEASE TRANSDERMAL
Qty: 7 | Refills: 0 | Status: ACTIVE | COMMUNITY
Start: 2018-12-13 | End: 1900-01-01

## 2019-04-15 ENCOUNTER — APPOINTMENT (OUTPATIENT)
Dept: HEMATOLOGY ONCOLOGY | Facility: CLINIC | Age: 44
End: 2019-04-15
Payer: COMMERCIAL

## 2019-04-15 VITALS
OXYGEN SATURATION: 96 % | TEMPERATURE: 98.8 F | HEART RATE: 123 BPM | RESPIRATION RATE: 18 BRPM | DIASTOLIC BLOOD PRESSURE: 140 MMHG | SYSTOLIC BLOOD PRESSURE: 178 MMHG

## 2019-04-15 VITALS — SYSTOLIC BLOOD PRESSURE: 160 MMHG | DIASTOLIC BLOOD PRESSURE: 120 MMHG

## 2019-04-15 PROCEDURE — 99215 OFFICE O/P EST HI 40 MIN: CPT

## 2019-05-07 ENCOUNTER — OUTPATIENT (OUTPATIENT)
Dept: OUTPATIENT SERVICES | Facility: HOSPITAL | Age: 44
LOS: 1 days | Discharge: HOME | End: 2019-05-07

## 2019-05-07 DIAGNOSIS — M79.2 NEURALGIA AND NEURITIS, UNSPECIFIED: ICD-10-CM

## 2019-05-09 ENCOUNTER — APPOINTMENT (OUTPATIENT)
Dept: HEMATOLOGY ONCOLOGY | Facility: CLINIC | Age: 44
End: 2019-05-09
Payer: COMMERCIAL

## 2019-05-09 ENCOUNTER — RX CHANGE (OUTPATIENT)
Age: 44
End: 2019-05-09

## 2019-05-09 VITALS
BODY MASS INDEX: 38.2 KG/M2 | RESPIRATION RATE: 17 BRPM | WEIGHT: 222.66 LBS | DIASTOLIC BLOOD PRESSURE: 118 MMHG | SYSTOLIC BLOOD PRESSURE: 163 MMHG | HEART RATE: 106 BPM | OXYGEN SATURATION: 94 % | TEMPERATURE: 98.7 F

## 2019-05-09 PROCEDURE — 99215 OFFICE O/P EST HI 40 MIN: CPT

## 2019-05-14 ENCOUNTER — RX RENEWAL (OUTPATIENT)
Age: 44
End: 2019-05-14

## 2019-05-17 ENCOUNTER — RX RENEWAL (OUTPATIENT)
Age: 44
End: 2019-05-17

## 2019-05-17 RX ORDER — FUROSEMIDE 20 MG/1
20 TABLET ORAL DAILY
Qty: 30 | Refills: 0 | Status: ACTIVE | COMMUNITY
Start: 2019-04-15 | End: 1900-01-01

## 2019-05-30 ENCOUNTER — RX RENEWAL (OUTPATIENT)
Age: 44
End: 2019-05-30

## 2019-05-30 ENCOUNTER — APPOINTMENT (OUTPATIENT)
Dept: HEMATOLOGY ONCOLOGY | Facility: CLINIC | Age: 44
End: 2019-05-30

## 2019-06-04 ENCOUNTER — OUTPATIENT (OUTPATIENT)
Dept: OUTPATIENT SERVICES | Facility: HOSPITAL | Age: 44
LOS: 1 days | Discharge: HOME | End: 2019-06-04

## 2019-06-04 DIAGNOSIS — M79.2 NEURALGIA AND NEURITIS, UNSPECIFIED: ICD-10-CM

## 2019-06-07 ENCOUNTER — APPOINTMENT (OUTPATIENT)
Dept: HEMATOLOGY ONCOLOGY | Facility: CLINIC | Age: 44
End: 2019-06-07
Payer: COMMERCIAL

## 2019-06-07 VITALS
TEMPERATURE: 98.9 F | SYSTOLIC BLOOD PRESSURE: 160 MMHG | HEART RATE: 131 BPM | OXYGEN SATURATION: 97 % | DIASTOLIC BLOOD PRESSURE: 100 MMHG

## 2019-06-07 PROCEDURE — 99215 OFFICE O/P EST HI 40 MIN: CPT

## 2019-06-24 ENCOUNTER — RX RENEWAL (OUTPATIENT)
Age: 44
End: 2019-06-24

## 2019-06-24 ENCOUNTER — APPOINTMENT (OUTPATIENT)
Dept: HEMATOLOGY ONCOLOGY | Facility: CLINIC | Age: 44
End: 2019-06-24
Payer: COMMERCIAL

## 2019-06-24 VITALS — OXYGEN SATURATION: 98 % | HEART RATE: 127 BPM | RESPIRATION RATE: 18 BRPM

## 2019-06-24 DIAGNOSIS — R60.0 LOCALIZED EDEMA: ICD-10-CM

## 2019-06-24 PROCEDURE — 99215 OFFICE O/P EST HI 40 MIN: CPT

## 2019-06-27 ENCOUNTER — MEDICATION RENEWAL (OUTPATIENT)
Age: 44
End: 2019-06-27

## 2019-06-29 PROBLEM — R60.0 LOWER EXTREMITY EDEMA: Status: ACTIVE | Noted: 2019-04-15

## 2019-06-29 NOTE — REVIEW OF SYSTEMS
[Anxiety] : anxiety [Depression] : depression [Negative] : Allergic/Immunologic [FreeTextEntry9] : severe neck and spine pain

## 2019-06-29 NOTE — PHYSICAL EXAM
[Ambulatory and capable of all self care but unable to carry out any work activities] : Status 2- Ambulatory and capable of all self care but unable to carry out any work activities. Up and about more than 50% of waking hours [Normal] : affect appropriate [de-identified] : /80 [de-identified] : incisions of 1 inch behind r ear, lat neck and r flank of 3 inches.

## 2019-06-29 NOTE — ASSESSMENT
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

## 2019-06-29 NOTE — HISTORY OF PRESENT ILLNESS
[de-identified] : Another difficult visit 2 weeks befor her next perioh npathy surgery w dr Hinton., c/onow w low back pain assoc w 50 lbs wt gain.No TFT's .Much spasm, ame in neck on r side.Can get 3 mos nucynta, not benzoHad fallen short of opana..and the removal of nucynta was a 'disaster'.Meds reviwed in detail including journal  [de-identified] : 41 year old woman with a prior extensive pain history related to severe nerve damage from a thryroidectomy for cancer of thyroid .The patient is referred because she has moved to this area after being cared for in [place].Her life circumstances now have changed and she needs to live in [place].Her pain is described as constant, with frequent periods of spasmodic pain.She has tried multiple meds by her previous PM Md who is also a psychiatrist.Pain control is always suboptimal and her functional status is severely impaired.She constatnly wears a cervical collar.Very difficult to sleep.She is totally disabled.

## 2019-06-29 NOTE — REASON FOR VISIT
[Follow-Up Visit] : a follow-up [Initial Consultation] : an initial consultation [FreeTextEntry2] : severe pain

## 2019-06-30 RX ORDER — OXYMORPHONE HYDROCHLORIDE 10 MG/1
10 TABLET ORAL
Qty: 150 | Refills: 0 | Status: ACTIVE | COMMUNITY
Start: 2018-09-28 | End: 1900-01-01

## 2019-07-11 ENCOUNTER — OUTPATIENT (OUTPATIENT)
Dept: OUTPATIENT SERVICES | Facility: HOSPITAL | Age: 44
LOS: 1 days | Discharge: HOME | End: 2019-07-11

## 2019-07-11 DIAGNOSIS — M79.2 NEURALGIA AND NEURITIS, UNSPECIFIED: ICD-10-CM

## 2019-07-14 ENCOUNTER — EMERGENCY (EMERGENCY)
Facility: HOSPITAL | Age: 44
LOS: 1 days | Discharge: LEFT BEFORE DISCHARGE | End: 2019-07-14
Attending: EMERGENCY MEDICINE
Payer: COMMERCIAL

## 2019-07-14 VITALS
TEMPERATURE: 99 F | HEART RATE: 99 BPM | RESPIRATION RATE: 18 BRPM | OXYGEN SATURATION: 93 % | SYSTOLIC BLOOD PRESSURE: 175 MMHG | DIASTOLIC BLOOD PRESSURE: 100 MMHG

## 2019-07-14 VITALS
HEIGHT: 64 IN | DIASTOLIC BLOOD PRESSURE: 127 MMHG | SYSTOLIC BLOOD PRESSURE: 209 MMHG | OXYGEN SATURATION: 97 % | HEART RATE: 97 BPM | RESPIRATION RATE: 16 BRPM

## 2019-07-14 LAB
ALBUMIN SERPL ELPH-MCNC: 4.2 G/DL — SIGNIFICANT CHANGE UP (ref 3.3–5)
ALP SERPL-CCNC: 114 U/L — SIGNIFICANT CHANGE UP (ref 40–120)
ALT FLD-CCNC: 12 U/L — SIGNIFICANT CHANGE UP (ref 10–45)
ANION GAP SERPL CALC-SCNC: 14 MMOL/L — SIGNIFICANT CHANGE UP (ref 5–17)
AST SERPL-CCNC: 9 U/L — LOW (ref 10–40)
BASOPHILS # BLD AUTO: 0 K/UL — SIGNIFICANT CHANGE UP (ref 0–0.2)
BASOPHILS NFR BLD AUTO: 0.2 % — SIGNIFICANT CHANGE UP (ref 0–2)
BILIRUB SERPL-MCNC: 0.4 MG/DL — SIGNIFICANT CHANGE UP (ref 0.2–1.2)
BUN SERPL-MCNC: 13 MG/DL — SIGNIFICANT CHANGE UP (ref 7–23)
CALCIUM SERPL-MCNC: 8.8 MG/DL — SIGNIFICANT CHANGE UP (ref 8.4–10.5)
CHLORIDE SERPL-SCNC: 97 MMOL/L — SIGNIFICANT CHANGE UP (ref 96–108)
CO2 SERPL-SCNC: 28 MMOL/L — SIGNIFICANT CHANGE UP (ref 22–31)
CREAT SERPL-MCNC: 0.75 MG/DL — SIGNIFICANT CHANGE UP (ref 0.5–1.3)
EOSINOPHIL # BLD AUTO: 0.2 K/UL — SIGNIFICANT CHANGE UP (ref 0–0.5)
EOSINOPHIL NFR BLD AUTO: 1.5 % — SIGNIFICANT CHANGE UP (ref 0–6)
GAS PNL BLDV: SIGNIFICANT CHANGE UP
GLUCOSE SERPL-MCNC: 134 MG/DL — HIGH (ref 70–99)
HCT VFR BLD CALC: 38.2 % — SIGNIFICANT CHANGE UP (ref 34.5–45)
HGB BLD-MCNC: 12.7 G/DL — SIGNIFICANT CHANGE UP (ref 11.5–15.5)
LYMPHOCYTES # BLD AUTO: 1.3 K/UL — SIGNIFICANT CHANGE UP (ref 1–3.3)
LYMPHOCYTES # BLD AUTO: 9.2 % — LOW (ref 13–44)
MCHC RBC-ENTMCNC: 29.3 PG — SIGNIFICANT CHANGE UP (ref 27–34)
MCHC RBC-ENTMCNC: 33.2 GM/DL — SIGNIFICANT CHANGE UP (ref 32–36)
MCV RBC AUTO: 88.1 FL — SIGNIFICANT CHANGE UP (ref 80–100)
MONOCYTES # BLD AUTO: 0.7 K/UL — SIGNIFICANT CHANGE UP (ref 0–0.9)
MONOCYTES NFR BLD AUTO: 4.6 % — SIGNIFICANT CHANGE UP (ref 2–14)
NEUTROPHILS # BLD AUTO: 12 K/UL — HIGH (ref 1.8–7.4)
NEUTROPHILS NFR BLD AUTO: 84.4 % — HIGH (ref 43–77)
PLATELET # BLD AUTO: 276 K/UL — SIGNIFICANT CHANGE UP (ref 150–400)
POTASSIUM SERPL-MCNC: 3.8 MMOL/L — SIGNIFICANT CHANGE UP (ref 3.5–5.3)
POTASSIUM SERPL-SCNC: 3.8 MMOL/L — SIGNIFICANT CHANGE UP (ref 3.5–5.3)
PROT SERPL-MCNC: 7.3 G/DL — SIGNIFICANT CHANGE UP (ref 6–8.3)
RBC # BLD: 4.33 M/UL — SIGNIFICANT CHANGE UP (ref 3.8–5.2)
RBC # FLD: 13.4 % — SIGNIFICANT CHANGE UP (ref 10.3–14.5)
SODIUM SERPL-SCNC: 139 MMOL/L — SIGNIFICANT CHANGE UP (ref 135–145)
WBC # BLD: 14.2 K/UL — HIGH (ref 3.8–10.5)
WBC # FLD AUTO: 14.2 K/UL — HIGH (ref 3.8–10.5)

## 2019-07-14 PROCEDURE — 85027 COMPLETE CBC AUTOMATED: CPT

## 2019-07-14 PROCEDURE — 99284 EMERGENCY DEPT VISIT MOD MDM: CPT

## 2019-07-14 PROCEDURE — 96374 THER/PROPH/DIAG INJ IV PUSH: CPT

## 2019-07-14 PROCEDURE — 76705 ECHO EXAM OF ABDOMEN: CPT | Mod: 26,RT

## 2019-07-14 PROCEDURE — 76705 ECHO EXAM OF ABDOMEN: CPT

## 2019-07-14 PROCEDURE — 80053 COMPREHEN METABOLIC PANEL: CPT

## 2019-07-14 PROCEDURE — 71045 X-RAY EXAM CHEST 1 VIEW: CPT | Mod: 26

## 2019-07-14 PROCEDURE — 96361 HYDRATE IV INFUSION ADD-ON: CPT

## 2019-07-14 PROCEDURE — 84702 CHORIONIC GONADOTROPIN TEST: CPT

## 2019-07-14 PROCEDURE — 71045 X-RAY EXAM CHEST 1 VIEW: CPT

## 2019-07-14 PROCEDURE — 99284 EMERGENCY DEPT VISIT MOD MDM: CPT | Mod: 25

## 2019-07-14 PROCEDURE — 83690 ASSAY OF LIPASE: CPT

## 2019-07-14 RX ORDER — ONDANSETRON 8 MG/1
4 TABLET, FILM COATED ORAL ONCE
Refills: 0 | Status: COMPLETED | OUTPATIENT
Start: 2019-07-14 | End: 2019-07-14

## 2019-07-14 RX ORDER — HYDROMORPHONE HYDROCHLORIDE 2 MG/ML
1 INJECTION INTRAMUSCULAR; INTRAVENOUS; SUBCUTANEOUS ONCE
Refills: 0 | Status: DISCONTINUED | OUTPATIENT
Start: 2019-07-14 | End: 2019-07-14

## 2019-07-14 RX ORDER — FENTANYL CITRATE 50 UG/ML
1200 INJECTION INTRAVENOUS ONCE
Refills: 0 | Status: DISCONTINUED | OUTPATIENT
Start: 2019-07-14 | End: 2019-07-14

## 2019-07-14 RX ORDER — ALPRAZOLAM 0.25 MG
1 TABLET ORAL ONCE
Refills: 0 | Status: DISCONTINUED | OUTPATIENT
Start: 2019-07-14 | End: 2019-07-14

## 2019-07-14 RX ORDER — SODIUM CHLORIDE 9 MG/ML
1000 INJECTION INTRAMUSCULAR; INTRAVENOUS; SUBCUTANEOUS ONCE
Refills: 0 | Status: COMPLETED | OUTPATIENT
Start: 2019-07-14 | End: 2019-07-14

## 2019-07-14 RX ADMIN — FENTANYL CITRATE 1200 MICROGRAM(S): 50 INJECTION INTRAVENOUS at 18:00

## 2019-07-14 RX ADMIN — ONDANSETRON 4 MILLIGRAM(S): 8 TABLET, FILM COATED ORAL at 16:51

## 2019-07-14 RX ADMIN — SODIUM CHLORIDE 1000 MILLILITER(S): 9 INJECTION INTRAMUSCULAR; INTRAVENOUS; SUBCUTANEOUS at 16:00

## 2019-07-14 RX ADMIN — SODIUM CHLORIDE 1000 MILLILITER(S): 9 INJECTION INTRAMUSCULAR; INTRAVENOUS; SUBCUTANEOUS at 17:18

## 2019-07-14 RX ADMIN — Medication 1 MILLIGRAM(S): at 17:33

## 2019-07-14 NOTE — ED ADULT NURSE NOTE - CHPI ED NUR SYMPTOMS NEG
no blood in stool/no abdominal distension/no burning urination/no diarrhea/no dysuria/no chills/no hematuria

## 2019-07-14 NOTE — ED ADULT NURSE NOTE - OBJECTIVE STATEMENT
43 yrs old female with h/o extensive health history present to the ER for RUQ pain since 07/9/2019. Pt reported that she had spinal surgery on 07/11/19 and 2 days prior to the surgery she started to experience RUQ abd pain radiating to the back which is getting progressively worse. Pt reports pain level of 8/10 on pain scale and sharp in quality. Pt also endorses nausea which she states is chronic. As per pt "I did not want to come here, my Doctor made me come to the ER." Pt also state she want to get an ultrasound and find out if she is septic and go home. Pt report that she gets Fentanyl at home and is concern she might not able to get an equivalent dose here. Pt also endorses fever for 2 days and took Tylenols prior to ER arrival . Pt has decrease ROM in her neck and is bedridden at present.

## 2019-07-14 NOTE — ED PROVIDER NOTE - CLINICAL SUMMARY MEDICAL DECISION MAKING FREE TEXT BOX
Pt p/w RUQ pain unrelated to chroni cancer pain, may be due to gastritis vs GB disease vs worsening of cancer pain.  Plan: RUQ us, labs, analgesia, IVF.

## 2019-07-14 NOTE — CONSULT NOTE ADULT - SUBJECTIVE AND OBJECTIVE BOX
GENERAL SURGERY CONSULT NOTE    Chief Complaint: Headache  HPI: 43F POD 3 from neurosurgery procedure performed in [place]. Patient states "it's very complicated procedure, you wouldn't understand" and that this neurosurgeon is "the only one in the country able to do it". Was discharged to a hotel room post-procedure and has since returnde home to [place]. Complains that her head hurts and that she is in intense pain for the past 10 years, and has undergone multiple medical procedures and tests to no avail. Difficult to understand, patient perseverates and is unclear historian. When describing abdominal pain, she points to her right ribcage/flank. States that she has a known history of gallstones.    PAST MEDICAL & SURGICAL HISTORY:    MEDICATIONS  (STANDING):    MEDICATIONS  (PRN):    Allergies    No Known Allergies    Intolerances      FAMILY HISTORY:    Vital Signs Last 24 Hrs  T(C): 37.3 (14 Jul 2019 18:54), Max: 37.6 (14 Jul 2019 15:17)  T(F): 99.2 (14 Jul 2019 18:54), Max: 99.6 (14 Jul 2019 15:17)  HR: 99 (14 Jul 2019 18:54) (93 - 99)  BP: 175/100 (14 Jul 2019 18:54) (152/100 - 209/127)  BP(mean): --  RR: 18 (14 Jul 2019 18:54) (16 - 18)  SpO2: 93% (14 Jul 2019 18:54) (93% - 98%)      Physical Exam    PHYSICAL EXAM:  General: Moderate distress, lying in bed. Obese. Parents at bedside.  Cardio: Audible S1/S2, RRR, No M/R/G  Resp: CTAB  Thorax: No chest wall tenderness  Breast: No lesions/masses, no drainage  GI/Abd: Soft, NTND, no rebound/guarding, no masses palpated. Right flank with minimal tenderness above costal margin.  Skin: Intact, no breakdown  RUE with limited mobility.    LABS                        12.7   14.2  )-----------( 276      ( 14 Jul 2019 15:44 )             38.2     07-14    139  |  97  |  13  ----------------------------<  134<H>  3.8   |  28  |  0.75    Ca    8.8      14 Jul 2019 15:44    TPro  7.3  /  Alb  4.2  /  TBili  0.4  /  DBili  x   /  AST  9<L>  /  ALT  12  /  AlkPhos  114  07-14    LIVER FUNCTIONS - ( 14 Jul 2019 15:44 )  Alb: 4.2 g/dL / Pro: 7.3 g/dL / ALK PHOS: 114 U/L / ALT: 12 U/L / AST: 9 U/L / GGT: x                     RADIOLOGY & ADDITIONAL STUDIES: Ultrasound negative for cholecystitis. Gallstones with no other concerning findings.    Assessment/Plan:    1. No acute surgical intervention given ultrasound findings and exam  2. Discussed with Dr. Stover  3. Dispo per ED. Patient stating she will be leaving AMA.

## 2019-07-14 NOTE — ED ADULT NURSE REASSESSMENT NOTE - NS ED NURSE REASSESS COMMENT FT1
Pt reports that she is no longer experiencing nausea , however continues to report of chronic pain to the neck .

## 2019-07-14 NOTE — ED PROVIDER NOTE - OBJECTIVE STATEMENT
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 Pain is constant and hurts to walk or lie on her right side. Notes pain has improved on the ambulance ride to the ER.   Recently had an operation at [facility] in [place] by a Dr. Kessler who she called on the phone and told her to come to ER if she was concerned about her pain. Takes fentanyl pops and opana at home.  States she just wants to be checked out and make sure her GB is ok and she is "not in sepsis" during interview      Pain mgmt/oncologst: Dr. 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 Pain is constant and hurts to walk or lie on her right side. Notes pain has improved on the ambulance ride to the ER.   Recently had an operation at [facility] in [place] by a Dr. Kessler who she called on the phone and told her to come to ER if she was concerned about her pain. Takes fentanyl pops and opana at home.  States she just wants to be checked out and make sure her GB is ok and she is "not in sepsis" during interview.          Pain mgmt/oncologst: Dr. 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 Pain is constant and hurts to walk or lie on her right side. Notes pain has improved on the ambulance ride to the ER.   Recently had an operation at [facility] in [place] by a Dr. Kessler who she called on the phone and told her to come to ER if she was concerned about her pain. Takes fentanyl pops and opana at home for pain.  States she just wants to be checked out and make sure her GB is ok and she is "not in sepsis" during interview.          Pain mgmt/oncologst: Dr. Love

## 2019-07-14 NOTE — ED PROVIDER NOTE - MUSCULOSKELETAL, MLM
TTP over right lower rib cage. Decreased strength and ROM of right arm. Able to move remainder of extremities equally.

## 2019-07-14 NOTE — ED PROVIDER NOTE - ATTENDING CONTRIBUTION TO CARE
42y/o F with h/o metastatic thyroid cancer with care established in this system and at [facility] in [place], who presents with severe abdomnial pain and headache; states she spoke with her doctors who advised her to come to the ED as she may have a gallbladder problem or gallstones (per patient's family).  Patient crying, occasionally screaming in pain about headache; refusing to allow most parts of exam, and refusing most attempts at pain medication/management; did agree to fentanyl lozenge (obtained from pharmacy) and took some home medications; declined evaluation/imginag of head - states that this is her usual pain related to brain metastsis and that we would not be equiped to help her: she only came here for evaluation of her gallbladder.    Abd is obese, diffuse R sided tenderness.    Initial DDx includes biliary colic/ cholecystitis or choledocholithiasis, but also to including renal colic or pyelonephritis, or other intraabdominal pathology; US obtained, showing gallstones with no gross evidence of cholecystitis, and labs obtained, notable for leukocytosis; however, unclear if this represents cholecystitis; surgery consulted, and felt this is not acute cholecystitis, and recommended further evaluation / management of symptoms as needed; offered/recommended to patient additional evaluation given symptoms, but patient no longer wishing to stay or be evaluated, both her and her parents (at bedside) recognize limitations of current evaluation, and wish to leave AMA.

## 2019-07-14 NOTE — ED ADULT NURSE NOTE - NSIMPLEMENTINTERV_GEN_ALL_ED
Implemented All Fall with Harm Risk Interventions:  [place] to call system. Call bell, personal items and telephone within reach. Instruct patient to call for assistance. Room bathroom lighting operational. Non-slip footwear when patient is off stretcher. Physically safe environment: no spills, clutter or unnecessary equipment. Stretcher in lowest position, wheels locked, appropriate side rails in place. Provide visual cue, wrist band, yellow gown, etc. Monitor gait and stability. Monitor for mental status changes and reorient to person, place, and time. Review medications for side effects contributing to fall risk. Reinforce activity limits and safety measures with patient and family. Provide visual clues: red socks.

## 2019-07-14 NOTE — ED PROVIDER NOTE - PROGRESS NOTE DETAILS
Pt and family at bedside want to leave AMA. I have assessed the patient's mental status and  the patient has capacity to make this decision. I have explained the risks of leaving without full treatment, including severe disability and death, which the patient understands and is willing to accept. I have answered all of the patient's questions. I reiterated my medical opinion and advised the patient to return at any time. We discussed the further workup outside of the current visit and return precautions. Pt requested parents sign for her. The patient and family at bedside want to leave AMA, do not believe further workup is necessary at this point. I have assessed the patient's mental status and  the patient has capacity to make this decision. I have explained the risks of leaving without full treatment, including severe disability and death, which the patient understands and is willing to accept. I have answered all of the patient's questions. I reiterated my medical opinion and advised the patient to return at any time. We discussed the further workup outside of the current visit and return precautions. Pt requested parents sign for her.

## 2019-07-15 ENCOUNTER — APPOINTMENT (OUTPATIENT)
Dept: HEMATOLOGY ONCOLOGY | Facility: CLINIC | Age: 44
End: 2019-07-15
Payer: COMMERCIAL

## 2019-07-15 VITALS
DIASTOLIC BLOOD PRESSURE: 142 MMHG | TEMPERATURE: 99.7 F | SYSTOLIC BLOOD PRESSURE: 177 MMHG | RESPIRATION RATE: 14 BRPM | HEART RATE: 105 BPM | OXYGEN SATURATION: 92 %

## 2019-07-15 PROCEDURE — 99215 OFFICE O/P EST HI 40 MIN: CPT

## 2019-07-15 RX ORDER — METOCLOPRAMIDE 5 MG/1
5 TABLET ORAL 3 TIMES DAILY
Qty: 90 | Refills: 0 | Status: ACTIVE | COMMUNITY
Start: 2017-12-20 | End: 1900-01-01

## 2019-07-15 RX ORDER — CEFUROXIME AXETIL 250 MG/1
250 TABLET ORAL
Qty: 20 | Refills: 0 | Status: ACTIVE | COMMUNITY
Start: 2019-07-15 | End: 1900-01-01

## 2019-07-16 ENCOUNTER — RX RENEWAL (OUTPATIENT)
Age: 44
End: 2019-07-16

## 2019-07-16 RX ORDER — TAPENTADOL HYDROCHLORIDE 100 MG/1
100 TABLET, FILM COATED, EXTENDED RELEASE ORAL DAILY
Qty: 30 | Refills: 0 | Status: ACTIVE | COMMUNITY
Start: 2017-12-20 | End: 1900-01-01

## 2019-07-16 RX ORDER — ONDANSETRON 4 MG/1
4 TABLET, ORALLY DISINTEGRATING ORAL 4 TIMES DAILY
Qty: 28 | Refills: 0 | Status: ACTIVE | COMMUNITY
Start: 2017-06-27 | End: 1900-01-01

## 2019-07-16 NOTE — HISTORY OF PRESENT ILLNESS
[de-identified] : 41 year old woman with a prior extensive pain history related to severe nerve damage from a thryroidectomy for cancer of thyroid .The patient is referred because she has moved to this area after being cared for in [place].Her life circumstances now have changed and she needs to live in [place].Her pain is described as constant, with frequent periods of spasmodic pain.She has tried multiple meds by her previous PM Md who is also a psychiatrist.Pain control is always suboptimal and her functional status is severely impaired.She constatnly wears a cervical collar.Very difficult to sleep.She is totally disabled. [de-identified] : Excruciatingly difficult visit for both patient, care giver , and practitioner.She had excisions of neuromas w 3 separate incisions on Thursday. in [place].Came back in a car, very painful.Can only be in bed in a semi right sided fetal position.Here in office she is in excruciating pain, in a w/c and nearly inconsolably tearful.Not cognitively as sharp as usual and level of distress is very high.

## 2019-07-16 NOTE — PHYSICAL EXAM
[Ambulatory and capable of all self care but unable to carry out any work activities] : Status 2- Ambulatory and capable of all self care but unable to carry out any work activities. Up and about more than 50% of waking hours [Normal] : affect appropriate [de-identified] : /80 [de-identified] : incisions of 1 inch behind r ear, lat neck and r flank of 3 inches.

## 2019-07-16 NOTE — ASSESSMENT
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 [Supportive] : Goals of care discussed with patient: Supportive

## 2019-07-22 ENCOUNTER — APPOINTMENT (OUTPATIENT)
Dept: HEMATOLOGY ONCOLOGY | Facility: CLINIC | Age: 44
End: 2019-07-22

## 2019-08-01 ENCOUNTER — RX RENEWAL (OUTPATIENT)
Age: 44
End: 2019-08-01

## 2019-08-02 ENCOUNTER — APPOINTMENT (OUTPATIENT)
Dept: HEMATOLOGY ONCOLOGY | Facility: CLINIC | Age: 44
End: 2019-08-02
Payer: COMMERCIAL

## 2019-08-02 VITALS — HEART RATE: 125 BPM | TEMPERATURE: 99.2 F | OXYGEN SATURATION: 93 % | RESPIRATION RATE: 18 BRPM

## 2019-08-02 PROCEDURE — 99215 OFFICE O/P EST HI 40 MIN: CPT

## 2019-08-02 RX ORDER — PROMETHAZINE HYDROCHLORIDE 25 MG/1
25 TABLET ORAL 3 TIMES DAILY
Qty: 90 | Refills: 0 | Status: ACTIVE | COMMUNITY
Start: 2019-08-02 | End: 1900-01-01

## 2019-08-15 ENCOUNTER — RX RENEWAL (OUTPATIENT)
Age: 44
End: 2019-08-15

## 2020-01-01 ENCOUNTER — APPOINTMENT (OUTPATIENT)
Dept: HEMATOLOGY ONCOLOGY | Facility: CLINIC | Age: 45
End: 2020-01-01
Payer: COMMERCIAL

## 2020-01-01 ENCOUNTER — OUTPATIENT (OUTPATIENT)
Dept: OUTPATIENT SERVICES | Facility: HOSPITAL | Age: 45
LOS: 1 days | Discharge: HOME | End: 2020-01-01

## 2020-01-01 ENCOUNTER — LABORATORY RESULT (OUTPATIENT)
Age: 45
End: 2020-01-01

## 2020-01-01 ENCOUNTER — APPOINTMENT (OUTPATIENT)
Dept: HEMATOLOGY ONCOLOGY | Facility: CLINIC | Age: 45
End: 2020-01-01

## 2020-01-01 VITALS — RESPIRATION RATE: 16 BRPM | OXYGEN SATURATION: 98 % | HEART RATE: 85 BPM | TEMPERATURE: 98.5 F

## 2020-01-01 VITALS — RESPIRATION RATE: 16 BRPM | OXYGEN SATURATION: 97 % | HEART RATE: 88 BPM | TEMPERATURE: 99.2 F

## 2020-01-01 VITALS — OXYGEN SATURATION: 94 % | HEART RATE: 100 BPM | TEMPERATURE: 97.9 F | RESPIRATION RATE: 18 BRPM

## 2020-01-01 VITALS — RESPIRATION RATE: 16 BRPM | TEMPERATURE: 98.9 F | OXYGEN SATURATION: 93 % | HEART RATE: 91 BPM

## 2020-01-01 DIAGNOSIS — B37.0 CANDIDAL STOMATITIS: ICD-10-CM

## 2020-01-01 DIAGNOSIS — M79.2 NEURALGIA AND NEURITIS, UNSPECIFIED: ICD-10-CM

## 2020-01-01 DIAGNOSIS — G89.4 CHRONIC PAIN SYNDROME: ICD-10-CM

## 2020-01-01 DIAGNOSIS — R50.9 FEVER, UNSPECIFIED: ICD-10-CM

## 2020-01-01 DIAGNOSIS — R20.8 OTHER DISTURBANCES OF SKIN SENSATION: ICD-10-CM

## 2020-01-01 DIAGNOSIS — Z51.5 ENCOUNTER FOR PALLIATIVE CARE: ICD-10-CM

## 2020-01-01 DIAGNOSIS — G56.40 CAUSALGIA OF UNSPECIFIED UPPER LIMB: ICD-10-CM

## 2020-01-01 PROCEDURE — 99215 OFFICE O/P EST HI 40 MIN: CPT

## 2020-01-01 PROCEDURE — 99214 OFFICE O/P EST MOD 30 MIN: CPT

## 2020-01-01 PROCEDURE — 99441: CPT

## 2020-01-01 PROCEDURE — 99215 OFFICE O/P EST HI 40 MIN: CPT | Mod: 95

## 2020-01-01 PROCEDURE — 99214 OFFICE O/P EST MOD 30 MIN: CPT | Mod: 95

## 2020-01-01 RX ORDER — FENTANYL CITRATE 1200 UG/1
1200 LOZENGE TRANSMUCOSAL
Qty: 180 | Refills: 0 | Status: ACTIVE | COMMUNITY
Start: 2018-03-08 | End: 1900-01-01

## 2020-01-01 RX ORDER — DIAZEPAM 10 MG/1
10 TABLET ORAL
Qty: 120 | Refills: 0 | Status: ACTIVE | COMMUNITY
Start: 2017-09-29 | End: 1900-01-01

## 2020-01-01 RX ORDER — METOCLOPRAMIDE 5 MG/1
5 TABLET ORAL 3 TIMES DAILY
Qty: 90 | Refills: 0 | Status: ACTIVE | COMMUNITY
Start: 2017-11-03 | End: 1900-01-01

## 2020-01-01 RX ORDER — TAPENTADOL HYDROCHLORIDE 200 MG/1
200 TABLET, FILM COATED, EXTENDED RELEASE ORAL
Qty: 60 | Refills: 0 | Status: ACTIVE | COMMUNITY
Start: 2017-12-20 | End: 1900-01-01

## 2020-01-01 RX ORDER — OXYMORPHONE HYDROCHLORIDE 10 MG/1
10 TABLET ORAL
Qty: 90 | Refills: 0 | Status: ACTIVE | COMMUNITY
Start: 2017-05-30 | End: 1900-01-01

## 2020-01-01 RX ORDER — DICLOFENAC SODIUM 10 MG/G
1 GEL TOPICAL DAILY
Qty: 1 | Refills: 6 | Status: ACTIVE | COMMUNITY
Start: 2020-01-01 | End: 1900-01-01

## 2020-01-01 RX ORDER — OXYMORPHONE HYDROCHLORIDE 10 MG/1
10 TABLET ORAL
Qty: 60 | Refills: 0 | Status: ACTIVE | COMMUNITY
Start: 2017-09-01 | End: 1900-01-01

## 2020-01-01 RX ORDER — CARISOPRODOL 350 MG/1
350 TABLET ORAL
Qty: 120 | Refills: 0 | Status: ACTIVE | COMMUNITY
Start: 2019-01-01 | End: 1900-01-01

## 2020-01-01 RX ORDER — ALPRAZOLAM 2 MG/1
2 TABLET, EXTENDED RELEASE ORAL
Qty: 60 | Refills: 0 | Status: ACTIVE | COMMUNITY
Start: 2020-01-01 | End: 1900-01-01

## 2020-01-01 RX ORDER — TAPENTADOL HYDROCHLORIDE 200 MG/1
200 TABLET, FILM COATED, EXTENDED RELEASE ORAL
Qty: 60 | Refills: 0 | Status: ACTIVE | COMMUNITY
Start: 2020-01-01 | End: 1900-01-01

## 2020-01-01 RX ORDER — DIAZEPAM 10 MG/1
10 TABLET ORAL
Qty: 90 | Refills: 0 | Status: ACTIVE | COMMUNITY
Start: 2017-12-20 | End: 1900-01-01

## 2020-01-01 RX ORDER — LIDOCAINE HYDROCHLORIDE 30 MG/G
3 CREAM TOPICAL
Qty: 1 | Refills: 2 | Status: ACTIVE | COMMUNITY
Start: 2019-03-11 | End: 1900-01-01

## 2020-01-01 RX ORDER — FENTANYL CITRATE 1200 UG/1
1200 LOZENGE TRANSMUCOSAL
Qty: 180 | Refills: 0 | Status: ACTIVE | COMMUNITY
Start: 2018-07-12 | End: 1900-01-01

## 2020-01-01 RX ORDER — FENTANYL CITRATE 1200 UG/1
1200 LOZENGE TRANSMUCOSAL
Qty: 180 | Refills: 0 | Status: ACTIVE | COMMUNITY
Start: 2019-06-24 | End: 1900-01-01

## 2020-01-01 RX ORDER — FENTANYL CITRATE 800 UG/1
800 LOZENGE TRANSMUCOSAL 4 TIMES DAILY
Qty: 120 | Refills: 0 | Status: ACTIVE | COMMUNITY
Start: 2017-06-27 | End: 1900-01-01

## 2020-01-01 RX ORDER — OXYMORPHONE HYDROCHLORIDE 10 MG/1
10 TABLET ORAL
Qty: 60 | Refills: 0 | Status: DISCONTINUED | COMMUNITY
Start: 2019-07-16 | End: 2020-01-01

## 2020-01-01 RX ORDER — ONDANSETRON 4 MG/1
4 TABLET, ORALLY DISINTEGRATING ORAL 4 TIMES DAILY
Qty: 40 | Refills: 0 | Status: ACTIVE | COMMUNITY
Start: 2017-06-26 | End: 1900-01-01

## 2020-01-01 RX ORDER — OXYMORPHONE HYDROCHLORIDE 20 MG/1
20 TABLET, FILM COATED, EXTENDED RELEASE ORAL
Qty: 150 | Refills: 0 | Status: ACTIVE | COMMUNITY
Start: 2018-10-26 | End: 1900-01-01

## 2020-01-01 RX ORDER — ALPRAZOLAM 1 MG/1
1 TABLET ORAL
Qty: 42 | Refills: 0 | Status: DISCONTINUED | COMMUNITY
Start: 2019-06-07 | End: 2020-01-01

## 2020-01-01 RX ORDER — NORTRIPTYLINE HYDROCHLORIDE 25 MG/1
25 CAPSULE ORAL
Qty: 60 | Refills: 6 | Status: ACTIVE | COMMUNITY
Start: 2020-01-01 | End: 1900-01-01

## 2020-01-01 RX ORDER — FENTANYL CITRATE 1200 UG/1
1200 LOZENGE TRANSMUCOSAL
Qty: 120 | Refills: 0 | Status: ACTIVE | COMMUNITY
Start: 2018-11-16 | End: 1900-01-01

## 2020-01-01 RX ORDER — OXYMORPHONE HYDROCHLORIDE 40 MG/1
40 TABLET, FILM COATED, EXTENDED RELEASE ORAL
Qty: 120 | Refills: 0 | Status: ACTIVE | COMMUNITY
Start: 2020-01-01 | End: 1900-01-01

## 2020-01-01 RX ORDER — TAPENTADOL HYDROCHLORIDE 200 MG/1
200 TABLET, FILM COATED, EXTENDED RELEASE ORAL
Qty: 30 | Refills: 0 | Status: ACTIVE | COMMUNITY
Start: 2017-05-26 | End: 1900-01-01

## 2020-01-01 RX ORDER — CLOTRIMAZOLE 10 MG/1
10 LOZENGE ORAL
Qty: 90 | Refills: 3 | Status: ACTIVE | COMMUNITY
Start: 2020-01-01 | End: 1900-01-01

## 2020-01-01 RX ORDER — OXYMORPHONE HYDROCHLORIDE 40 MG/1
40 TABLET, FILM COATED, EXTENDED RELEASE ORAL
Qty: 90 | Refills: 0 | Status: DISCONTINUED | COMMUNITY
Start: 2018-01-25 | End: 2020-01-01

## 2020-01-01 RX ORDER — OXYMORPHONE HYDROCHLORIDE 40 MG/1
40 TABLET, FILM COATED, EXTENDED RELEASE ORAL
Qty: 90 | Refills: 0 | Status: DISCONTINUED | COMMUNITY
Start: 2019-01-01 | End: 2020-01-01

## 2020-01-01 RX ORDER — TAPENTADOL HYDROCHLORIDE 100 MG/1
100 TABLET, FILM COATED, EXTENDED RELEASE ORAL
Qty: 60 | Refills: 0 | Status: ACTIVE | COMMUNITY
Start: 2017-06-26 | End: 1900-01-01

## 2020-01-01 RX ORDER — ALPRAZOLAM 1 MG/1
1 TABLET ORAL
Qty: 180 | Refills: 0 | Status: ACTIVE | COMMUNITY
Start: 2019-03-27 | End: 1900-01-01

## 2020-01-01 RX ORDER — CARISOPRODOL 350 MG/1
350 TABLET ORAL
Qty: 120 | Refills: 0 | Status: ACTIVE | COMMUNITY
Start: 2017-12-22 | End: 1900-01-01

## 2020-01-02 NOTE — PHYSICAL EXAM
[Ambulatory and capable of all self care but unable to carry out any work activities] : Status 2- Ambulatory and capable of all self care but unable to carry out any work activities. Up and about more than 50% of waking hours [Normal] : affect appropriate [de-identified] : right sided exquisite tenderness w paracervical spasm [de-identified] : no overt ecchymosis /trauma [de-identified] : no radiational pain on deep palp of strnum/pressure

## 2020-01-02 NOTE — ASSESSMENT
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

## 2020-01-02 NOTE — HISTORY OF PRESENT ILLNESS
[de-identified] : Had very troublesome issues w filling Rx at [facility] mail order.No delivery for 3 weeks after Rx.Has a few days left of a short term Rx.Will reconcile today.Some days have been better w current format [de-identified] : 44 year old woman with a prior extensive pain history related to severe nerve damage from a thryroidectomy for cancer of thyroid .The patient is referred because she has moved to this area after being cared for in [place].Her life circumstances now have changed and she needs to live in [place].Her pain is described as constant, with frequent periods of spasmodic pain.She has tried multiple meds by her previous PM Md who is also a psychiatrist.Pain control is always suboptimal and her functional status is severely impaired.She constatnly wears a cervical collar.Very difficult to sleep.She is totally disabled.

## 2020-01-02 NOTE — PHYSICAL EXAM
[Ambulatory and capable of all self care but unable to carry out any work activities] : Status 2- Ambulatory and capable of all self care but unable to carry out any work activities. Up and about more than 50% of waking hours [Normal] : affect appropriate [de-identified] : rstricted in motion w r paracervical spasm and spinal tenderness [de-identified] : no radiational pain on deep palp of strnum/pressure [de-identified] : no overt ecchymosis /trauma

## 2020-01-02 NOTE — ASSESSMENT
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

## 2020-01-02 NOTE — PHYSICAL EXAM
[Ambulatory and capable of all self care but unable to carry out any work activities] : Status 2- Ambulatory and capable of all self care but unable to carry out any work activities. Up and about more than 50% of waking hours [Normal] : affect appropriate [de-identified] : /80 [de-identified] : Restricted ROM, w paracervical tenderness and spasm [de-identified] : incisions of 1 inch behind r ear, lat neck and r flank of 3 inches.

## 2020-01-02 NOTE — ASSESSMENT
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

## 2020-01-02 NOTE — PHYSICAL EXAM
[Ambulatory and capable of all self care but unable to carry out any work activities] : Status 2- Ambulatory and capable of all self care but unable to carry out any work activities. Up and about more than 50% of waking hours [Normal] : affect appropriate [de-identified] : restricted motion, w spasmodic tenderess ( as always appreciated) [de-identified] : no overt ecchymosis /trauma [de-identified] : no radiational pain on deep palp of strnum/pressure

## 2020-01-02 NOTE — PHYSICAL EXAM
[Ambulatory and capable of all self care but unable to carry out any work activities] : Status 2- Ambulatory and capable of all self care but unable to carry out any work activities. Up and about more than 50% of waking hours [Normal] : affect appropriate [de-identified] : Restricted in movement w r paracervical tenderness [de-identified] : no radiational pain on deep palp of strnum/pressure [de-identified] : no overt ecchymosis /trauma

## 2020-01-02 NOTE — HISTORY OF PRESENT ILLNESS
[de-identified] : Structured visit w ongoing additional planning ideas to be d/w us as per pt's attempt at rotation and wean of currrent meds, sp nucynta and OTFC.She is less tearful and labile at times w her mood and sense of hope clearer.Use of opana to equianalgesically wean her off OTFC discussed at length.. [de-identified] : 41 year old woman with a prior extensive pain history related to severe nerve damage from a thryroidectomy for cancer of thyroid .The patient is referred because she has moved to this area after being cared for in [place].Her life circumstances now have changed and she needs to live in [place].Her pain is described as constant, with frequent periods of spasmodic pain.She has tried multiple meds by her previous PM Md who is also a psychiatrist.Pain control is always suboptimal and her functional status is severely impaired.She constatnly wears a cervical collar.Very difficult to sleep.She is totally disabled.

## 2020-01-02 NOTE — HISTORY OF PRESENT ILLNESS
[de-identified] : Has decided to go forward with the surgery to remove the neuromas in [place]Is empasizing the main benmefit of OTFC as the only med that can keep her functional at 3-4 per day.The other opioids are not as effective.We then embarked on a detailed d/w pt and brother about next "bridge steps" prior to Sx [de-identified] : 41 year old woman with a prior extensive pain history related to severe nerve damage from a thryroidectomy for cancer of thyroid .The patient is referred because she has moved to this area after being cared for in [place].Her life circumstances now have changed and she needs to live in [place].Her pain is described as constant, with frequent periods of spasmodic pain.She has tried multiple meds by her previous PM Md who is also a psychiatrist.Pain control is always suboptimal and her functional status is severely impaired.She constatnly wears a cervical collar.Very difficult to sleep.She is totally disabled.

## 2020-01-02 NOTE — PHYSICAL EXAM
[Ambulatory and capable of all self care but unable to carry out any work activities] : Status 2- Ambulatory and capable of all self care but unable to carry out any work activities. Up and about more than 50% of waking hours [Normal] : affect appropriate [de-identified] : /80 [de-identified] : incisions of 1 inch behind r ear, lat neck and r flank of 3 inches. [de-identified] : rstricted ROM, w paracervical spasm and tenderness

## 2020-01-02 NOTE — REVIEW OF SYSTEMS
[Depression] : depression [Anxiety] : anxiety [Negative] : Allergic/Immunologic [FreeTextEntry9] : severe neck and spine pain

## 2020-01-02 NOTE — HISTORY OF PRESENT ILLNESS
[de-identified] : Additional planning ideas to be d/w us as per pt's attempt at rotation and wean of curerent meds, sp nucynta and OTFC.She remains tearful and labile at times w her mood and sense of hope. [de-identified] : 41 year old woman with a prior extensive pain history related to severe nerve damage from a thryroidectomy for cancer of thyroid .The patient is referred because she has moved to this area after being cared for in [place].Her life circumstances now have changed and she needs to live in [place].Her pain is described as constant, with frequent periods of spasmodic pain.She has tried multiple meds by her previous PM Md who is also a psychiatrist.Pain control is always suboptimal and her functional status is severely impaired.She constatnly wears a cervical collar.Very difficult to sleep.She is totally disabled.

## 2020-01-02 NOTE — PHYSICAL EXAM
[Ambulatory and capable of all self care but unable to carry out any work activities] : Status 2- Ambulatory and capable of all self care but unable to carry out any work activities. Up and about more than 50% of waking hours [Normal] : grossly intact [de-identified] : rstricted motion w (chronic) paracervical tenderness and spasm [de-identified] : /80 [de-identified] : incisions of 1 inch behind r ear, lat neck and r flank of 3 inches.

## 2020-01-02 NOTE — ASSESSMENT
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

## 2020-01-02 NOTE — HISTORY OF PRESENT ILLNESS
[de-identified] : 41 year old woman with a prior extensive pain history related to severe nerve damage from a thryroidectomy for cancer of thyroid .The patient is referred because she has moved to this area after being cared for in [place].Her life circumstances now have changed and she needs to live in [place].Her pain is described as constant, with frequent periods of spasmodic pain.She has tried multiple meds by her previous PM Md who is also a psychiatrist.Pain control is always suboptimal and her functional status is severely impaired.She constatnly wears a cervical collar.Very difficult to sleep.She is totally disabled. [de-identified] : Mood a little better initially, less tearful throughout visit.Ongoing issues w trying to control pain w least amt of opioids.but finds in order to function optimlly she needs to use OTFC more frequently than 1 or 2 times a week..Meds, doses and Rx's d/w pt at length.

## 2020-01-02 NOTE — ASSESSMENT
[Supportive] : Goals of care discussed with patient: Supportive [FreeTextEntry1] : Ongoing issues w hypertension and mild lower ext. 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

## 2020-01-02 NOTE — HISTORY OF PRESENT ILLNESS
[de-identified] : 41 year old woman with a prior extensive pain history related to severe nerve damage from a thryroidectomy for cancer of thyroid .The patient is referred because she has moved to this area after being cared for in [place].Her life circumstances now have changed and she needs to live in [place].Her pain is described as constant, with frequent periods of spasmodic pain.She has tried multiple meds by her previous PM Md who is also a psychiatrist.Pain control is always suboptimal and her functional status is severely impaired.She constatnly wears a cervical collar.Very difficult to sleep.She is totally disabled. [de-identified] : Had been actually doing much better in the weeks following Sx.But tripped and fell over a walker, hurting her ribs, worse after 2-3 d.No SOB.

## 2020-01-02 NOTE — ASSESSMENT
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 IDDS placement.Will consider again.In meantime will revert to a more effective regimen from last month.Meds reconciled.

## 2020-01-02 NOTE — ASSESSMENT
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

## 2020-01-02 NOTE — PHYSICAL EXAM
[Ambulatory and capable of all self care but unable to carry out any work activities] : Status 2- Ambulatory and capable of all self care but unable to carry out any work activities. Up and about more than 50% of waking hours [Normal] : grossly intact [de-identified] : Restricted motion w r sided paracervical spqsm and tenderness [de-identified] : no overt ecchymosis /trauma [de-identified] : no radiational pain on deep palp of strnum/pressure

## 2020-01-02 NOTE — HISTORY OF PRESENT ILLNESS
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 [de-identified] : Again tearful and r eye twitching.Trying to cont slow wean ( 60% ) admits it was too fast on her own

## 2020-01-02 NOTE — PHYSICAL EXAM
[Ambulatory and capable of all self care but unable to carry out any work activities] : Status 2- Ambulatory and capable of all self care but unable to carry out any work activities. Up and about more than 50% of waking hours [Normal] : affect appropriate [de-identified] : /80 [de-identified] : rstricted ROM, w tenderness and spasm, r paracervical region [de-identified] : incisions of 1 inch behind r ear, lat neck and r flank of 3 inches.

## 2020-01-02 NOTE — REVIEW OF SYSTEMS
[Anxiety] : anxiety [Depression] : depression [Negative] : Heme/Lymph [FreeTextEntry9] : severe neck and spine pain

## 2020-01-02 NOTE — ASSESSMENT
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 IDDS placement.Will consider again.In meantime will revert to a more effective regimen from last month.Meds reconciled.

## 2020-01-02 NOTE — HISTORY OF PRESENT ILLNESS
[de-identified] : Monthly viosit, having severe, 8/10 painand spasms r side of neck, which is hard and tender to touch. She is again tearful and r eye twitching.Unable to continue w wean.Is mor depressed.?Meds.Having inc in chronic night terrors [de-identified] : 44 year old woman with a prior extensive pain history related to severe nerve damage from a thryroidectomy for cancer of thyroid .The patient is referred because she has moved to this area after being cared for in [place].Her life circumstances now have changed and she needs to live in [place].Her pain is described as constant, with frequent periods of spasmodic pain.She has tried multiple meds by her previous PM Md who is also a psychiatrist.Pain control is always suboptimal and her functional status is severely impaired.She constatnly wears a cervical collar.Very difficult to sleep.She is totally disabled.

## 2020-01-02 NOTE — PHYSICAL EXAM
[Ambulatory and capable of all self care but unable to carry out any work activities] : Status 2- Ambulatory and capable of all self care but unable to carry out any work activities. Up and about more than 50% of waking hours [Normal] : affect appropriate [de-identified] : restricted motion, with ongoing r sided tenderness along areas of surgery [de-identified] : no overt ecchymosis /trauma [de-identified] : no radiational pain on deep palp of strnum/pressure

## 2020-01-02 NOTE — HISTORY OF PRESENT ILLNESS
[de-identified] : 41 year old woman with a prior extensive pain history related to severe nerve damage from a thryroidectomy for cancer of thyroid .The patient is referred because she has moved to this area after being cared for in [place].Her life circumstances now have changed and she needs to live in [place].Her pain is described as constant, with frequent periods of spasmodic pain.She has tried multiple meds by her previous PM Md who is also a psychiatrist.Pain control is always suboptimal and her functional status is severely impaired.She constatnly wears a cervical collar.Very difficult to sleep.She is totally disabled. [de-identified] : Less tearful, calmer...states she is moving..BP better/wt states has a 3 mos plan..OTFC: 3/d , 2/ d, 1/ d, then prn.lidocaine infusion def helping her per Dr Adkins.Lasix made her very fatigued at qod.advised tiw  or biw.

## 2020-01-02 NOTE — PHYSICAL EXAM
[Ambulatory and capable of all self care but unable to carry out any work activities] : Status 2- Ambulatory and capable of all self care but unable to carry out any work activities. Up and about more than 50% of waking hours [Normal] : affect appropriate [de-identified] : Restricted ROM, w paracervical spasm and tenderness [de-identified] : no overt ecchymosis /trauma [de-identified] : no radiational pain on deep palp of strnum/pressure

## 2020-01-02 NOTE — HISTORY OF PRESENT ILLNESS
[de-identified] : 41 year old woman with a prior extensive pain history related to severe nerve damage from a thryroidectomy for cancer of thyroid .The patient is referred because she has moved to this area after being cared for in [place].Her life circumstances now have changed and she needs to live in [place].Her pain is described as constant, with frequent periods of spasmodic pain.She has tried multiple meds by her previous PM Md who is also a psychiatrist.Pain control is always suboptimal and her functional status is severely impaired.She constatnly wears a cervical collar.Very difficult to sleep.She is totally disabled. [de-identified] : Tearful once again and frustrated with her pain and complicated management.Carefully takes notes.Here is her current regime,.:ydaily routine:box 1 t4, hr later nucynta er 200, to 300, nucynta IR 50 at same time, opana 40 er 9am, wellbutrin 300.valium 10 mg w tylenol 10 am.12-1.willy;ium nucynta ir 50, cytomel 5, 6-7, nu IR 50, valium 10, opana er 40, cytomel 5.o.3 patch clonidine....1-2 1200, 2-4 20 mg opana profusely sweats when she uses 2 pt lowering of pain.valium 10 qhs...gaining wt 15 lbs last 2 months. RPT /120

## 2020-01-02 NOTE — HISTORY OF PRESENT ILLNESS
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 [de-identified] : Tearful, labile but consolable.worst ever...withdrawal issues,rectal bleeding, can't use suppositories.8/10...plateaued...poor mood 10tearful...going too fast...so many problems , no energy [id number] 3times /day...horrible:new pain pattern ,spasm, "screamingT routine.shooting, paroxysma in between...30 sec to a minute.dysesthesia, paroxysmal.."best" score 6 (2x)h

## 2020-01-02 NOTE — ASSESSMENT
[Supportive] : Goals of care discussed with patient: Supportive [FreeTextEntry1] : Hypertension and mild lower ext. 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

## 2020-01-02 NOTE — ASSESSMENT
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

## 2020-01-03 NOTE — ASSESSMENT
[FreeTextEntry1] : Waxing and waning CRPS/Central sensitization in sEvere CRPS rsultant from numerous attempts at correcting neuromas and scarring/fibrosis from thyroid cancer surgery and its aftermath from LN dissection, as described in detail by her recent surgeon.This account to be abstracted in chart.To cont w opioid based therapy for now, tho non-invasive attempts at pain coping still being developed. Patient

## 2020-01-03 NOTE — PHYSICAL EXAM
[Ambulatory and capable of all self care but unable to carry out any work activities] : Status 2- Ambulatory and capable of all self care but unable to carry out any work activities. Up and about more than 50% of waking hours [Normal] : affect appropriate [de-identified] : /80 [de-identified] : ROM restricted w r paracerival and spinal tenderness [de-identified] : incisions of 1 inch behind r ear, lat neck and r flank of 3 inches.

## 2020-01-03 NOTE — PHYSICAL EXAM
[Ambulatory and capable of all self care but unable to carry out any work activities] : Status 2- Ambulatory and capable of all self care but unable to carry out any work activities. Up and about more than 50% of waking hours [Normal] : affect appropriate [de-identified] : /80 [de-identified] : restricted ROM, w tenderness and spasm, r paracervical region and spine [de-identified] : incisions of 1 inch behind r ear, lat neck and r flank of 3 inches.

## 2020-01-03 NOTE — HISTORY OF PRESENT ILLNESS
[de-identified] : 41 year old woman with a prior extensive pain history related to severe nerve damage from a thryroidectomy for cancer of thyroid .The patient is referred because she has moved to this area after being cared for in [place].Her life circumstances now have changed and she needs to live in [place].Her pain is described as constant, with frequent periods of spasmodic pain.She has tried multiple meds by her previous PM Md who is also a psychiatrist.Pain control is always suboptimal and her functional status is severely impaired.She constatnly wears a cervical collar.Very difficult to sleep.She is totally disabled. [de-identified] : Has a plan..saw [facility]"not a good fit"work while in bed...pop issue.less than one per week passes out w stabs..proposes..(while waiting for botox) can cause a flare.has to do PT...getting very weak;300 ER Nucynta..opana supp in PM 40 mg, needs a pop at times..

## 2020-01-03 NOTE — PHYSICAL EXAM
[Ambulatory and capable of all self care but unable to carry out any work activities] : Status 2- Ambulatory and capable of all self care but unable to carry out any work activities. Up and about more than 50% of waking hours [Normal] : affect appropriate [de-identified] : /80 [de-identified] : As per previous exam,. restricted ROM w tenderness and spasm paracerv/spinal regions [de-identified] : incisions of 1 inch behind r ear, lat neck and r flank of 3 inches.

## 2020-01-03 NOTE — HISTORY OF PRESENT ILLNESS
[de-identified] : Again near inconsolable..Tearful, in rigid neck collar and sling.multiple c/o.Had seen Dr Hinton who wrote an extensive note w a plan of surgical care which pt doesn't trust.Getting a second opinion. [de-identified] : 41 year old woman with a prior extensive pain history related to severe nerve damage from a thryroidectomy for cancer of thyroid .The patient is referred because she has moved to this area after being cared for in [place].Her life circumstances now have changed and she needs to live in [place].Her pain is described as constant, with frequent periods of spasmodic pain.She has tried multiple meds by her previous PM Md who is also a psychiatrist.Pain control is always suboptimal and her functional status is severely impaired.She constatnly wears a cervical collar.Very difficult to sleep.She is totally disabled.

## 2020-01-03 NOTE — ASSESSMENT
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, as her aqol remaims a major disruptive force in her life and level of existential suffering.

## 2020-01-03 NOTE — ASSESSMENT
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

## 2020-01-03 NOTE — HISTORY OF PRESENT ILLNESS
[de-identified] : 41 year old woman with a prior extensive pain history related to severe nerve damage from a thryroidectomy for cancer of thyroid .The patient is referred because she has moved to this area after being cared for in [place].Her life circumstances now have changed and she needs to live in [place].Her pain is described as constant, with frequent periods of spasmodic pain.She has tried multiple meds by her previous PM Md who is also a psychiatrist.Pain control is always suboptimal and her functional status is severely impaired.She constatnly wears a cervical collar.Very difficult to sleep.She is totally disabled. [de-identified] : tearful ..doesn't like lack of usual cognition, off pops q month..Dr Dr Thomas at [facility] may revise SCS.. opana w immed relase nucynta helps, needs nucynta for mood and pain meds, seeing Dr James, 4-5 Xanax 1 mg.using opana suppositories, 1 Opna ER at night,soma at night is helpful.

## 2020-01-03 NOTE — HISTORY OF PRESENT ILLNESS
[de-identified] : 41 year old woman with a prior extensive pain history related to severe nerve damage from a thryroidectomy for cancer of thyroid .The patient is referred because she has moved to this area after being cared for in [place].Her life circumstances now have changed and she needs to live in [place].Her pain is described as constant, with frequent periods of spasmodic pain.She has tried multiple meds by her previous PM Md who is also a psychiatrist.Pain control is always suboptimal and her functional status is severely impaired.She constatnly wears a cervical collar.Very difficult to sleep.She is totally disabled. [de-identified] : Tearful, in rigid neck collar and sling.multiple c/o.Having spasms of pain q 30 min for a second..has to use a pop during that time..1-2 per day.Had been in bed all holiday, only can get out of bed w pop..asking about inc in clonidine.Seeing surgeon who did neurom removal, next week, may remove more, which are populating the right side of her neck.Her r arm has minimal motor strength due to dissection of muscles around her neck and shoulder.She falls frequently, but w/o major traumas

## 2020-01-03 NOTE — ASSESSMENT
[FreeTextEntry1] : Eacerbations of waxing and waning CRPS/Central sensitization in sEvere CRPS rsultant from numerous attempts at correcting neuromas and scarring/fibrosis from thyroid cancer surgery and its aftermath from LN dissection, as described in detail by her recent surgeon.This account to be abstracted in chart.To cont w opioid based therapy for now, tho non-invasive attempts at pain coping still being developed.

## 2020-01-25 NOTE — HISTORY OF PRESENT ILLNESS
[de-identified] : Structured repeat visit., having severe, 8/10 painand spasms r side of neck, which is hard and tender to touch. She is again tearful and r eye twitching.Unable to continue w wean.Is more depressed.?Meds.Having inc in chronic night terrors.Trying to modify meds downward, which is a constant struggle.Occ higher use of OTFC, which is problematic but at times necessary for her. 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

## 2020-01-25 NOTE — ASSESSMENT
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 IDDS placement.Will consider again.In meantime will revert to a more effective regimen from last month.Meds reconciled.Inc OTFC this month only

## 2020-02-16 PROBLEM — G56.40: Status: RESOLVED | Noted: 2020-01-01 | Resolved: 2020-01-01

## 2020-02-16 NOTE — HISTORY OF PRESENT ILLNESS
[de-identified] : Extreme stress from recent malpractice trial, which she lost.No changes in her underlying pain issues. 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

## 2020-02-16 NOTE — PHYSICAL EXAM
[Ambulatory and capable of all self care but unable to carry out any work activities] : Status 2- Ambulatory and capable of all self care but unable to carry out any work activities. Up and about more than 50% of waking hours [Normal] : grossly intact [de-identified] : right sided exquisite tenderness w paracervical spasm [de-identified] : no overt ecchymosis /trauma [de-identified] : no radiational pain on deep palp of strnum/pressure

## 2020-02-16 NOTE — REVIEW OF SYSTEMS
[Anxiety] : anxiety [Depression] : depression [FreeTextEntry9] : severe neck and spine pain [Negative] : Allergic/Immunologic

## 2020-02-16 NOTE — REASON FOR VISIT
[Initial Consultation] : an initial consultation [Follow-Up Visit] : a follow-up [FreeTextEntry2] : severe pain

## 2020-03-08 NOTE — ASSESSMENT
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 IDDS placement.Will consider again.In meantime will revert to a more effective regimen from last month.Meds reconciled.Inc OTFC this month only

## 2020-03-08 NOTE — PHYSICAL EXAM
[Ambulatory and capable of all self care but unable to carry out any work activities] : Status 2- Ambulatory and capable of all self care but unable to carry out any work activities. Up and about more than 50% of waking hours [Normal] : affect appropriate [de-identified] : right sided exquisite tenderness w paracervical spasm [de-identified] : no overt ecchymosis /trauma [de-identified] : no radiational pain on deep palp of strnum/pressure

## 2020-03-08 NOTE — HISTORY OF PRESENT ILLNESS
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 [de-identified] : "worse" due to meds? Still w extreme stress from recent malpractice trial, which she lost.No changes in her underlying pain issues.Needs OTFC to maintain some function otherwise just in bed all day.Ongoing dialogue re SCS vs pain puymp ( IDDS)

## 2020-03-31 PROBLEM — B37.0 ORAL THRUSH: Status: ACTIVE | Noted: 2020-01-01

## 2020-05-01 PROBLEM — R50.9 FEVER, INTERMITTENT: Status: ACTIVE | Noted: 2019-07-15

## 2020-05-01 NOTE — PHYSICAL EXAM
[Ambulatory and capable of all self care but unable to carry out any work activities] : Status 2- Ambulatory and capable of all self care but unable to carry out any work activities. Up and about more than 50% of waking hours [Normal] : well developed, well nourished, in no acute distress [de-identified] : right sided exquisite tenderness w paracervical spasm [de-identified] : no overt ecchymosis /trauma [de-identified] : no radiational pain on deep palp of strnum/pressure

## 2020-05-01 NOTE — HISTORY OF PRESENT ILLNESS
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 [de-identified] : Pt evaluated via 2 way video conferencing using [employer].Pt well visualized, in bed at her residence.I am at my residence using a lap top.Pt states she has been very sick in recent days w fever and pneumonia.She has been in contact with her PCP, who is treating her w antibiotics.She has a minimal cough and no overt SOB.Her pcp says there is "no point" in testing her for COVID.She is practicing guidelines w family.Her pain is similar, tho she has cut down on OTFC to 2 per day.She uses occ xanax and takes nucynta and oxymorphone as in the past w/o change.Her mood remains depressed. .

## 2020-05-01 NOTE — ASSESSMENT
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 [Supportive] : Goals of care discussed with patient: Supportive

## 2020-05-29 PROBLEM — M79.2 NEUROPATHIC PAIN SYNDROME (NON-HERPETIC): Status: ACTIVE | Noted: 2017-04-27

## 2020-05-29 PROBLEM — G89.4 CHRONIC PAIN SYNDROME: Status: ACTIVE | Noted: 2018-03-13

## 2020-06-01 NOTE — ASSESSMENT
[Supportive] : Goals of care discussed with patient: Supportive [FreeTextEntry1] : Recurrent fevers ? COVID? from CG? 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

## 2020-06-01 NOTE — REASON FOR VISIT
[Home] : at home, [unfilled] , at the time of the visit. [Medical Office: ([facility])___] : at the medical office located in  [Formal Caregiver] : formal caregiver [Follow-Up Visit] : a follow-up [Initial Consultation] : an initial consultation [Verbal consent obtained from patient] : the patient, [unfilled] [FreeTextEntry2] : severe pain

## 2020-06-01 NOTE — HISTORY OF PRESENT ILLNESS
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 [de-identified] : Once again, Pt evaluated via 2 way video conferencing using [employer].Pt well visualized, in bed at her residence.She states she ran a fever again, w a couigh, but is better now.Comes and goes,Has been bed-ridden for 2 mos, not out of the house.Her caregiver is asymptomatic and they practice guidelines, tho neither has been tested.Caregiver does not live w her.Pt is doing sltly better.Wants to try pamelor, which she had tried many years ago, but which did not correlate with her level of pain now.

## 2020-06-01 NOTE — PHYSICAL EXAM
[Ambulatory and capable of all self care but unable to carry out any work activities] : Status 2- Ambulatory and capable of all self care but unable to carry out any work activities. Up and about more than 50% of waking hours [Normal] : well developed, well nourished, in no acute distress [de-identified] : right sided exquisite tenderness w paracervical spasm [de-identified] : no overt ecchymosis /trauma [de-identified] : no radiational pain on deep palp of strnum/pressure

## 2020-06-24 NOTE — ASSESSMENT
[Supportive] : Goals of care discussed with patient: Supportive [FreeTextEntry1] : Some improvement in functional status w current med regimen.Pt wants to change TD Oxymorphone and reduce OTFC, which i agree with.Also needs Soma for spasms.

## 2020-06-24 NOTE — REASON FOR VISIT
[Home] : at home, [unfilled] , at the time of the visit. [Medical Office: ([facility])___] : at the medical office located in  [Formal Caregiver] : formal caregiver [Verbal consent obtained from patient] : the patient, [unfilled] [Follow-Up Visit] : a follow-up [Initial Consultation] : an initial consultation [FreeTextEntry2] : severe pain

## 2020-06-24 NOTE — HISTORY OF PRESENT ILLNESS
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 [de-identified] : Doing better.Tested neg for COVID w s/s, but caretaker was pos. for AB.So ,likely had COVID in April/May.Using 6 popos per day, getting up and avbout, wants to start cutting back again.

## 2020-06-24 NOTE — PHYSICAL EXAM
[Ambulatory and capable of all self care but unable to carry out any work activities] : Status 2- Ambulatory and capable of all self care but unable to carry out any work activities. Up and about more than 50% of waking hours [Normal] : well developed, well nourished, in no acute distress [de-identified] : right sided exquisite tenderness w paracervical spasm [de-identified] : no overt ecchymosis /trauma [de-identified] : no radiational pain on deep palp of strnum/pressure

## 2020-07-15 NOTE — PHYSICAL EXAM
[Ambulatory and capable of all self care but unable to carry out any work activities] : Status 2- Ambulatory and capable of all self care but unable to carry out any work activities. Up and about more than 50% of waking hours [Normal] : grossly intact [de-identified] : right sided exquisite tenderness w paracervical spasm on light touch [de-identified] : no overt ecchymosis /trauma [de-identified] : no radiational pain on deep palp of strnum/pressure

## 2020-07-15 NOTE — HISTORY OF PRESENT ILLNESS
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 [de-identified] : In office visit.May have had COVID, since she was very sick and only got swabbed almost 3 months later, plus, her caregiver has pos.AB's.Pain under better control w current use of 1200 OTFC 4-6 X per day.Occ uses oxymorphone supp when needed.Nuccynta is ongoing.Xanax is ongoing.Overall somewhat more functional now that she is OOB and doing her ADLs.We also discussed next steps? IDDS

## 2020-08-08 PROBLEM — Z51.5 PALLIATIVE CARE BY SPECIALIST: Status: ACTIVE | Noted: 2017-11-15

## 2020-08-08 PROBLEM — R20.8 DYSESTHESIA: Status: ACTIVE | Noted: 2017-05-08

## 2020-08-08 NOTE — HISTORY OF PRESENT ILLNESS
[de-identified] : Some stability in symptoms w regimen that varies, within previously established parameters.Meds reviewed and reconciled. 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

## 2020-08-08 NOTE — REASON FOR VISIT
[Home] : at home, [unfilled] , at the time of the visit. [Medical Office: ([facility])___] : at the medical office located in  [Formal Caregiver] : formal caregiver [Follow-Up Visit] : a follow-up [Verbal consent obtained from patient] : the patient, [unfilled] [Initial Consultation] : an initial consultation [FreeTextEntry2] : severe pain

## 2020-08-08 NOTE — PHYSICAL EXAM
[Normal] : well developed, well nourished, in no acute distress [Ambulatory and capable of all self care but unable to carry out any work activities] : Status 2- Ambulatory and capable of all self care but unable to carry out any work activities. Up and about more than 50% of waking hours [de-identified] : right sided exquisite tenderness w paracervical spasm [de-identified] : no radiational pain on deep palp of strnum/pressure [de-identified] : no overt ecchymosis /trauma

## 2020-08-08 NOTE — ASSESSMENT
[FreeTextEntry1] : Some stabiity/improvement in functional status w current med regimen.Pt inq re IDDS as well as PBM...not available presently..will f/u availability [Supportive] : Goals of care discussed with patient: Supportive

## 2020-08-27 ENCOUNTER — APPOINTMENT (OUTPATIENT)
Dept: HEMATOLOGY ONCOLOGY | Facility: CLINIC | Age: 45
End: 2020-08-27

## 2022-05-01 NOTE — ASSESSMENT
[Supportive] : Goals of care discussed with patient: Supportive [FreeTextEntry1] : Chronic pain, s/p neuroma/ectomy, awaiting Op report.Doing somewhat better, absent a fall w self-limited trauma that does not appear significant clinically.Will cont w usual adjustnments of core of meds. pt has c/o chest congestion and sinus infxn. pt recently finished amoxicillin.